# Patient Record
Sex: MALE | Race: WHITE | NOT HISPANIC OR LATINO | Employment: OTHER | URBAN - METROPOLITAN AREA
[De-identification: names, ages, dates, MRNs, and addresses within clinical notes are randomized per-mention and may not be internally consistent; named-entity substitution may affect disease eponyms.]

---

## 2024-03-01 ENCOUNTER — PRE-ADMISSION TESTING (OUTPATIENT)
Dept: PREADMISSION TESTING | Facility: HOSPITAL | Age: 69
End: 2024-03-01
Payer: MEDICARE

## 2024-03-01 VITALS — HEIGHT: 71 IN | BODY MASS INDEX: 34.57 KG/M2 | WEIGHT: 246.91 LBS

## 2024-03-01 LAB
ALBUMIN SERPL-MCNC: 4.6 G/DL (ref 3.5–5.2)
ALBUMIN/GLOB SERPL: 1.9 G/DL
ALP SERPL-CCNC: 88 U/L (ref 39–117)
ALT SERPL W P-5'-P-CCNC: 10 U/L (ref 1–41)
ANION GAP SERPL CALCULATED.3IONS-SCNC: 10 MMOL/L (ref 5–15)
APTT PPP: 28.1 SECONDS (ref 22–39)
AST SERPL-CCNC: 13 U/L (ref 1–40)
BASOPHILS # BLD AUTO: 0.07 10*3/MM3 (ref 0–0.2)
BASOPHILS NFR BLD AUTO: 0.7 % (ref 0–1.5)
BILIRUB SERPL-MCNC: 0.5 MG/DL (ref 0–1.2)
BUN SERPL-MCNC: 18 MG/DL (ref 8–23)
BUN/CREAT SERPL: 18.2 (ref 7–25)
CALCIUM SPEC-SCNC: 9.2 MG/DL (ref 8.6–10.5)
CHLORIDE SERPL-SCNC: 102 MMOL/L (ref 98–107)
CO2 SERPL-SCNC: 27 MMOL/L (ref 22–29)
CREAT SERPL-MCNC: 0.99 MG/DL (ref 0.76–1.27)
DEPRECATED RDW RBC AUTO: 43.3 FL (ref 37–54)
EGFRCR SERPLBLD CKD-EPI 2021: 84 ML/MIN/1.73
EOSINOPHIL # BLD AUTO: 0.29 10*3/MM3 (ref 0–0.4)
EOSINOPHIL NFR BLD AUTO: 3.1 % (ref 0.3–6.2)
ERYTHROCYTE [DISTWIDTH] IN BLOOD BY AUTOMATED COUNT: 12.1 % (ref 12.3–15.4)
GLOBULIN UR ELPH-MCNC: 2.4 GM/DL
GLUCOSE SERPL-MCNC: 95 MG/DL (ref 65–99)
HBA1C MFR BLD: 5.6 % (ref 4.8–5.6)
HCT VFR BLD AUTO: 44 % (ref 37.5–51)
HGB BLD-MCNC: 15.2 G/DL (ref 13–17.7)
IMM GRANULOCYTES # BLD AUTO: 0.06 10*3/MM3 (ref 0–0.05)
IMM GRANULOCYTES NFR BLD AUTO: 0.6 % (ref 0–0.5)
INR PPP: 1.01 (ref 0.89–1.12)
LYMPHOCYTES # BLD AUTO: 2.3 10*3/MM3 (ref 0.7–3.1)
LYMPHOCYTES NFR BLD AUTO: 24.4 % (ref 19.6–45.3)
MCH RBC QN AUTO: 33.5 PG (ref 26.6–33)
MCHC RBC AUTO-ENTMCNC: 34.5 G/DL (ref 31.5–35.7)
MCV RBC AUTO: 96.9 FL (ref 79–97)
MONOCYTES # BLD AUTO: 0.79 10*3/MM3 (ref 0.1–0.9)
MONOCYTES NFR BLD AUTO: 8.4 % (ref 5–12)
NEUTROPHILS NFR BLD AUTO: 5.91 10*3/MM3 (ref 1.7–7)
NEUTROPHILS NFR BLD AUTO: 62.8 % (ref 42.7–76)
NRBC BLD AUTO-RTO: 0 /100 WBC (ref 0–0.2)
PLATELET # BLD AUTO: 178 10*3/MM3 (ref 140–450)
PMV BLD AUTO: 9.3 FL (ref 6–12)
POTASSIUM SERPL-SCNC: 4.7 MMOL/L (ref 3.5–5.2)
PROT SERPL-MCNC: 7 G/DL (ref 6–8.5)
PROTHROMBIN TIME: 13.4 SECONDS (ref 12.2–14.5)
RBC # BLD AUTO: 4.54 10*6/MM3 (ref 4.14–5.8)
SODIUM SERPL-SCNC: 139 MMOL/L (ref 136–145)
WBC NRBC COR # BLD AUTO: 9.42 10*3/MM3 (ref 3.4–10.8)

## 2024-03-01 PROCEDURE — 36415 COLL VENOUS BLD VENIPUNCTURE: CPT

## 2024-03-01 PROCEDURE — 85610 PROTHROMBIN TIME: CPT

## 2024-03-01 PROCEDURE — 85730 THROMBOPLASTIN TIME PARTIAL: CPT

## 2024-03-01 PROCEDURE — 93005 ELECTROCARDIOGRAM TRACING: CPT

## 2024-03-01 PROCEDURE — 85025 COMPLETE CBC W/AUTO DIFF WBC: CPT

## 2024-03-01 PROCEDURE — 83036 HEMOGLOBIN GLYCOSYLATED A1C: CPT

## 2024-03-01 PROCEDURE — 80053 COMPREHEN METABOLIC PANEL: CPT

## 2024-03-01 PROCEDURE — 93010 ELECTROCARDIOGRAM REPORT: CPT | Performed by: INTERNAL MEDICINE

## 2024-03-01 RX ORDER — HYDROCODONE BITARTRATE AND ACETAMINOPHEN 5; 325 MG/1; MG/1
1 TABLET ORAL EVERY EVENING
COMMUNITY

## 2024-03-01 RX ORDER — ROSUVASTATIN CALCIUM 5 MG/1
5 TABLET, COATED ORAL DAILY
COMMUNITY

## 2024-03-01 RX ORDER — GLIMEPIRIDE 2 MG/1
2 TABLET ORAL
COMMUNITY

## 2024-03-01 RX ORDER — LISINOPRIL 2.5 MG/1
2.5 TABLET ORAL DAILY
COMMUNITY

## 2024-03-01 NOTE — PAT
An arrival time for procedure was not provided during PAT visit. If patient had any questions or concerns about their arrival time, they were instructed to contact their surgeon/physician.  Additionally, if the patient referred to an arrival time that was acquired from their my chart account, patient was encouraged to verify that time with their surgeon/physician. Arrival times are NOT provided in Pre Admission Testing Department.        Patient's surgeon called in a prescription for Benzol Peroxide 5% wash to [facility].  Patient instructed to  from [facility] pharmacy that was submitted electronically.  Verbal and written instructions given regarding proper use of the Benzoyl Peroxide wash were provided to patient and/or famlily during PAT visit. Patient/family also instructed to complete Benzol Peroxide checklist and return it to Pre-op on the day of surgery.  Patient and/or family verbalized understanding.      Additionally, reinforced with patient to acquire this prescription from the [facility] pharmacy before leaving the hospital after PAT visit due to the potential unavailability at local pharmacies.    Per Anesthesia Request, patient instructed not to take their ACE/ARB medications on the AM of surgery.

## 2024-03-02 LAB
QT INTERVAL: 334 MS
QTC INTERVAL: 428 MS

## 2024-03-07 ENCOUNTER — ANESTHESIA EVENT (OUTPATIENT)
Dept: PERIOP | Facility: HOSPITAL | Age: 69
End: 2024-03-07
Payer: MEDICARE

## 2024-03-07 RX ORDER — FAMOTIDINE 10 MG/ML
20 INJECTION, SOLUTION INTRAVENOUS ONCE
Status: CANCELLED | OUTPATIENT
Start: 2024-03-07 | End: 2024-03-07

## 2024-03-07 RX ORDER — SODIUM CHLORIDE 0.9 % (FLUSH) 0.9 %
10 SYRINGE (ML) INJECTION AS NEEDED
Status: CANCELLED | OUTPATIENT
Start: 2024-03-07

## 2024-03-07 RX ORDER — SODIUM CHLORIDE 9 MG/ML
40 INJECTION, SOLUTION INTRAVENOUS AS NEEDED
Status: CANCELLED | OUTPATIENT
Start: 2024-03-07

## 2024-03-08 ENCOUNTER — ANESTHESIA (OUTPATIENT)
Dept: PERIOP | Facility: HOSPITAL | Age: 69
End: 2024-03-08
Payer: MEDICARE

## 2024-03-08 ENCOUNTER — HOSPITAL ENCOUNTER (OUTPATIENT)
Facility: HOSPITAL | Age: 69
Discharge: HOME | End: 2024-03-09
Attending: ORTHOPAEDIC SURGERY | Admitting: ORTHOPAEDIC SURGERY
Payer: MEDICARE

## 2024-03-08 ENCOUNTER — APPOINTMENT (OUTPATIENT)
Dept: GENERAL RADIOLOGY | Facility: HOSPITAL | Age: 69
End: 2024-03-08
Payer: MEDICARE

## 2024-03-08 ENCOUNTER — ANESTHESIA EVENT CONVERTED (OUTPATIENT)
Dept: ANESTHESIOLOGY | Facility: HOSPITAL | Age: 69
End: 2024-03-08
Payer: MEDICARE

## 2024-03-08 DIAGNOSIS — Z96.611 STATUS POST REVERSE TOTAL REPLACEMENT OF RIGHT SHOULDER: Primary | ICD-10-CM

## 2024-03-08 PROBLEM — E66.9 OBESITY (BMI 30-39.9): Status: ACTIVE | Noted: 2024-03-08

## 2024-03-08 PROBLEM — I10 HTN (HYPERTENSION): Status: ACTIVE | Noted: 2024-03-08

## 2024-03-08 PROBLEM — E11.9 DM2 (DIABETES MELLITUS, TYPE 2): Status: ACTIVE | Noted: 2024-03-08

## 2024-03-08 PROBLEM — S42.209A PROXIMAL HUMERAL FRACTURE: Status: ACTIVE | Noted: 2024-03-08

## 2024-03-08 LAB
GLUCOSE BLDC GLUCOMTR-MCNC: 129 MG/DL (ref 70–130)
GLUCOSE BLDC GLUCOMTR-MCNC: 219 MG/DL (ref 70–130)
GLUCOSE BLDC GLUCOMTR-MCNC: 264 MG/DL (ref 70–130)

## 2024-03-08 PROCEDURE — A9270 NON-COVERED ITEM OR SERVICE: HCPCS | Performed by: INTERNAL MEDICINE

## 2024-03-08 PROCEDURE — 97110 THERAPEUTIC EXERCISES: CPT

## 2024-03-08 PROCEDURE — G0378 HOSPITAL OBSERVATION PER HR: HCPCS

## 2024-03-08 PROCEDURE — 25010000002 ROPIVACAINE HCL-NACL 0.2-0.9 % SOLUTION: Performed by: ORTHOPAEDIC SURGERY

## 2024-03-08 PROCEDURE — 25010000002 DEXAMETHASONE SODIUM PHOSPHATE 10 MG/ML SOLUTION: Performed by: NURSE ANESTHETIST, CERTIFIED REGISTERED

## 2024-03-08 PROCEDURE — 25010000002 PROPOFOL 10 MG/ML EMULSION: Performed by: ANESTHESIOLOGY

## 2024-03-08 PROCEDURE — 25010000002 FENTANYL CITRATE (PF) 50 MCG/ML SOLUTION: Performed by: NURSE ANESTHETIST, CERTIFIED REGISTERED

## 2024-03-08 PROCEDURE — 63710000001 INSULIN LISPRO (HUMAN) PER 5 UNITS: Performed by: INTERNAL MEDICINE

## 2024-03-08 PROCEDURE — C1713 ANCHOR/SCREW BN/BN,TIS/BN: HCPCS | Performed by: ORTHOPAEDIC SURGERY

## 2024-03-08 PROCEDURE — 25810000003 LACTATED RINGERS PER 1000 ML: Performed by: ANESTHESIOLOGY

## 2024-03-08 PROCEDURE — 25010000002 SUGAMMADEX 200 MG/2ML SOLUTION: Performed by: ANESTHESIOLOGY

## 2024-03-08 PROCEDURE — 94640 AIRWAY INHALATION TREATMENT: CPT

## 2024-03-08 PROCEDURE — 82948 REAGENT STRIP/BLOOD GLUCOSE: CPT

## 2024-03-08 PROCEDURE — C1889 IMPLANT/INSERT DEVICE, NOC: HCPCS | Performed by: ORTHOPAEDIC SURGERY

## 2024-03-08 PROCEDURE — 25010000002 PHENYLEPHRINE 10 MG/ML SOLUTION: Performed by: ANESTHESIOLOGY

## 2024-03-08 PROCEDURE — C1776 JOINT DEVICE (IMPLANTABLE): HCPCS | Performed by: ORTHOPAEDIC SURGERY

## 2024-03-08 PROCEDURE — 25010000002 ONDANSETRON PER 1 MG

## 2024-03-08 PROCEDURE — 25810000003 SODIUM CHLORIDE 0.9 % SOLUTION 250 ML FLEX CONT: Performed by: ANESTHESIOLOGY

## 2024-03-08 PROCEDURE — 63710000001 INSULIN DETEMIR PER 5 UNITS: Performed by: INTERNAL MEDICINE

## 2024-03-08 PROCEDURE — 25010000002 BUPIVACAINE (PF) 0.25 % SOLUTION: Performed by: NURSE ANESTHETIST, CERTIFIED REGISTERED

## 2024-03-08 PROCEDURE — 94799 UNLISTED PULMONARY SVC/PX: CPT

## 2024-03-08 PROCEDURE — 63710000001 ROSUVASTATIN 10 MG TABLET: Performed by: INTERNAL MEDICINE

## 2024-03-08 PROCEDURE — 25010000002 PHENYLEPHRINE 10 MG/ML SOLUTION 1 ML VIAL: Performed by: ANESTHESIOLOGY

## 2024-03-08 PROCEDURE — 73020 X-RAY EXAM OF SHOULDER: CPT

## 2024-03-08 PROCEDURE — 25010000002 DEXAMETHASONE PER 1 MG

## 2024-03-08 PROCEDURE — 25010000002 CEFAZOLIN PER 500 MG: Performed by: ORTHOPAEDIC SURGERY

## 2024-03-08 PROCEDURE — 25010000002 VANCOMYCIN 1 G RECONSTITUTED SOLUTION: Performed by: ORTHOPAEDIC SURGERY

## 2024-03-08 PROCEDURE — 97166 OT EVAL MOD COMPLEX 45 MIN: CPT

## 2024-03-08 DEVICE — ABSORBABLE HEMOSTAT (OXIDIZED REGENERATED CELLULOSE)
Type: IMPLANTABLE DEVICE | Site: SHOULDER | Status: FUNCTIONAL
Brand: SURGICEL

## 2024-03-08 DEVICE — LINER HUM EQUINOXE REVSHLDR 38PLS2.5: Type: IMPLANTABLE DEVICE | Site: SHOULDER | Status: FUNCTIONAL

## 2024-03-08 DEVICE — KWIRE EQUINOXE GLENOID NITNL 2X190MM NS: Type: IMPLANTABLE DEVICE | Site: SHOULDER | Status: FUNCTIONAL

## 2024-03-08 DEVICE — SCRW COMPR EQUINOXE LK 4.5X30MM: Type: IMPLANTABLE DEVICE | Site: SHOULDER | Status: FUNCTIONAL

## 2024-03-08 DEVICE — SCRW LK EQUINOXE GLENOSPHERE REV/SHLDR: Type: IMPLANTABLE DEVICE | Site: SHOULDER | Status: FUNCTIONAL

## 2024-03-08 DEVICE — SUT LP BONE NICELOOP NONABS W/NDL SZ5 24IN WHT: Type: IMPLANTABLE DEVICE | Site: SHOULDER | Status: FUNCTIONAL

## 2024-03-08 DEVICE — SUT LP BONE NICELOOP NONABS W/NDL SZ5 24IN GRN: Type: IMPLANTABLE DEVICE | Site: SHOULDER | Status: FUNCTIONAL

## 2024-03-08 DEVICE — SCRW EQUINOXE TORQ DEFINE REV SHLDR KT: Type: IMPLANTABLE DEVICE | Site: SHOULDER | Status: FUNCTIONAL

## 2024-03-08 DEVICE — SUT LP BONE NICELOOP NONABS BR W/NDL SZ5 24IN WHT/GRN: Type: IMPLANTABLE DEVICE | Site: SHOULDER | Status: FUNCTIONAL

## 2024-03-08 DEVICE — GLENOSPHERE SHLDR/REV EQUINOXE W/CP/LK/EXT 38MM: Type: IMPLANTABLE DEVICE | Site: SHOULDER | Status: FUNCTIONAL

## 2024-03-08 DEVICE — SCRW COMPR EQUINOXE LK 4.5X34MM: Type: IMPLANTABLE DEVICE | Site: SHOULDER | Status: FUNCTIONAL

## 2024-03-08 DEVICE — PLT GLEN JNT EQUINOXE AUG/SUPERIOR 10DEG: Type: IMPLANTABLE DEVICE | Site: SHOULDER | Status: FUNCTIONAL

## 2024-03-08 DEVICE — STEM HUM PRI EQUINX PF 11MM: Type: IMPLANTABLE DEVICE | Site: SHOULDER | Status: FUNCTIONAL

## 2024-03-08 DEVICE — IMPLANTABLE DEVICE: Type: IMPLANTABLE DEVICE | Site: SHOULDER | Status: FUNCTIONAL

## 2024-03-08 DEVICE — TRY HUM EQUINOXE ADPT REV SHLDR PLS5: Type: IMPLANTABLE DEVICE | Site: SHOULDER | Status: FUNCTIONAL

## 2024-03-08 RX ORDER — PHENYLEPHRINE HYDROCHLORIDE 10 MG/ML
INJECTION INTRAVENOUS AS NEEDED
Status: DISCONTINUED | OUTPATIENT
Start: 2024-03-08 | End: 2024-03-08 | Stop reason: SURG

## 2024-03-08 RX ORDER — ONDANSETRON 2 MG/ML
INJECTION INTRAMUSCULAR; INTRAVENOUS AS NEEDED
Status: DISCONTINUED | OUTPATIENT
Start: 2024-03-08 | End: 2024-03-08 | Stop reason: SURG

## 2024-03-08 RX ORDER — ROPIVACAINE HYDROCHLORIDE 2 MG/ML
INJECTION, SOLUTION EPIDURAL; INFILTRATION; PERINEURAL CONTINUOUS
Status: DISCONTINUED | OUTPATIENT
Start: 2024-03-08 | End: 2024-03-09 | Stop reason: HOSPADM

## 2024-03-08 RX ORDER — LIDOCAINE HYDROCHLORIDE 10 MG/ML
0.5 INJECTION, SOLUTION EPIDURAL; INFILTRATION; INTRACAUDAL; PERINEURAL ONCE AS NEEDED
Status: COMPLETED | OUTPATIENT
Start: 2024-03-08 | End: 2024-03-08

## 2024-03-08 RX ORDER — TRANEXAMIC ACID 10 MG/ML
1000 INJECTION, SOLUTION INTRAVENOUS ONCE
Status: COMPLETED | OUTPATIENT
Start: 2024-03-08 | End: 2024-03-08

## 2024-03-08 RX ORDER — NICOTINE 21 MG/24HR
1 PATCH, TRANSDERMAL 24 HOURS TRANSDERMAL
Status: DISCONTINUED | OUTPATIENT
Start: 2024-03-08 | End: 2024-03-09 | Stop reason: HOSPADM

## 2024-03-08 RX ORDER — MIDAZOLAM HYDROCHLORIDE 1 MG/ML
0.5 INJECTION INTRAMUSCULAR; INTRAVENOUS
Status: DISCONTINUED | OUTPATIENT
Start: 2024-03-08 | End: 2024-03-08 | Stop reason: HOSPADM

## 2024-03-08 RX ORDER — ACETAMINOPHEN 500 MG
1000 TABLET ORAL ONCE
Status: COMPLETED | OUTPATIENT
Start: 2024-03-08 | End: 2024-03-08

## 2024-03-08 RX ORDER — VANCOMYCIN HYDROCHLORIDE 1 G/20ML
INJECTION, POWDER, LYOPHILIZED, FOR SOLUTION INTRAVENOUS AS NEEDED
Status: DISCONTINUED | OUTPATIENT
Start: 2024-03-08 | End: 2024-03-08 | Stop reason: HOSPADM

## 2024-03-08 RX ORDER — MAGNESIUM HYDROXIDE 1200 MG/15ML
LIQUID ORAL AS NEEDED
Status: DISCONTINUED | OUTPATIENT
Start: 2024-03-08 | End: 2024-03-08 | Stop reason: HOSPADM

## 2024-03-08 RX ORDER — BUPIVACAINE HYDROCHLORIDE 2.5 MG/ML
INJECTION, SOLUTION EPIDURAL; INFILTRATION; INTRACAUDAL
Status: COMPLETED | OUTPATIENT
Start: 2024-03-08 | End: 2024-03-08

## 2024-03-08 RX ORDER — IPRATROPIUM BROMIDE AND ALBUTEROL SULFATE 2.5; .5 MG/3ML; MG/3ML
SOLUTION RESPIRATORY (INHALATION)
Status: COMPLETED
Start: 2024-03-08 | End: 2024-03-08

## 2024-03-08 RX ORDER — IPRATROPIUM BROMIDE AND ALBUTEROL SULFATE 2.5; .5 MG/3ML; MG/3ML
3 SOLUTION RESPIRATORY (INHALATION) ONCE
Status: COMPLETED | OUTPATIENT
Start: 2024-03-08 | End: 2024-03-08

## 2024-03-08 RX ORDER — NALOXONE HCL 0.4 MG/ML
0.1 VIAL (ML) INJECTION
Status: DISCONTINUED | OUTPATIENT
Start: 2024-03-08 | End: 2024-03-09 | Stop reason: HOSPADM

## 2024-03-08 RX ORDER — DEXTROSE MONOHYDRATE 25 G/50ML
25 INJECTION, SOLUTION INTRAVENOUS
Status: DISCONTINUED | OUTPATIENT
Start: 2024-03-08 | End: 2024-03-09 | Stop reason: HOSPADM

## 2024-03-08 RX ORDER — ROSUVASTATIN CALCIUM 10 MG/1
5 TABLET, COATED ORAL NIGHTLY
Status: DISCONTINUED | OUTPATIENT
Start: 2024-03-08 | End: 2024-03-09 | Stop reason: HOSPADM

## 2024-03-08 RX ORDER — DEXAMETHASONE SODIUM PHOSPHATE 4 MG/ML
INJECTION, SOLUTION INTRA-ARTICULAR; INTRALESIONAL; INTRAMUSCULAR; INTRAVENOUS; SOFT TISSUE AS NEEDED
Status: DISCONTINUED | OUTPATIENT
Start: 2024-03-08 | End: 2024-03-08 | Stop reason: SURG

## 2024-03-08 RX ORDER — PREGABALIN 75 MG/1
75 CAPSULE ORAL ONCE
Status: COMPLETED | OUTPATIENT
Start: 2024-03-08 | End: 2024-03-08

## 2024-03-08 RX ORDER — IBUPROFEN 600 MG/1
1 TABLET ORAL
Status: DISCONTINUED | OUTPATIENT
Start: 2024-03-08 | End: 2024-03-09 | Stop reason: HOSPADM

## 2024-03-08 RX ORDER — OXYCODONE HYDROCHLORIDE 5 MG/1
5 TABLET ORAL EVERY 4 HOURS PRN
Status: DISCONTINUED | OUTPATIENT
Start: 2024-03-08 | End: 2024-03-09 | Stop reason: HOSPADM

## 2024-03-08 RX ORDER — LABETALOL HYDROCHLORIDE 5 MG/ML
10 INJECTION, SOLUTION INTRAVENOUS EVERY 4 HOURS PRN
Status: DISCONTINUED | OUTPATIENT
Start: 2024-03-08 | End: 2024-03-09 | Stop reason: HOSPADM

## 2024-03-08 RX ORDER — FENTANYL CITRATE 50 UG/ML
50 INJECTION, SOLUTION INTRAMUSCULAR; INTRAVENOUS
Status: DISCONTINUED | OUTPATIENT
Start: 2024-03-08 | End: 2024-03-08 | Stop reason: HOSPADM

## 2024-03-08 RX ORDER — HYDROMORPHONE HYDROCHLORIDE 1 MG/ML
0.5 INJECTION, SOLUTION INTRAMUSCULAR; INTRAVENOUS; SUBCUTANEOUS
Status: DISCONTINUED | OUTPATIENT
Start: 2024-03-08 | End: 2024-03-08 | Stop reason: HOSPADM

## 2024-03-08 RX ORDER — SODIUM CHLORIDE, SODIUM LACTATE, POTASSIUM CHLORIDE, CALCIUM CHLORIDE 600; 310; 30; 20 MG/100ML; MG/100ML; MG/100ML; MG/100ML
9 INJECTION, SOLUTION INTRAVENOUS CONTINUOUS
Status: DISCONTINUED | OUTPATIENT
Start: 2024-03-08 | End: 2024-03-08

## 2024-03-08 RX ORDER — PROPOFOL 10 MG/ML
VIAL (ML) INTRAVENOUS AS NEEDED
Status: DISCONTINUED | OUTPATIENT
Start: 2024-03-08 | End: 2024-03-08 | Stop reason: SURG

## 2024-03-08 RX ORDER — HYDROMORPHONE HYDROCHLORIDE 1 MG/ML
0.5 INJECTION, SOLUTION INTRAMUSCULAR; INTRAVENOUS; SUBCUTANEOUS
Status: DISCONTINUED | OUTPATIENT
Start: 2024-03-08 | End: 2024-03-09 | Stop reason: HOSPADM

## 2024-03-08 RX ORDER — FAMOTIDINE 20 MG/1
20 TABLET, FILM COATED ORAL ONCE
Status: COMPLETED | OUTPATIENT
Start: 2024-03-08 | End: 2024-03-08

## 2024-03-08 RX ORDER — ROCURONIUM BROMIDE 10 MG/ML
INJECTION, SOLUTION INTRAVENOUS AS NEEDED
Status: DISCONTINUED | OUTPATIENT
Start: 2024-03-08 | End: 2024-03-08 | Stop reason: SURG

## 2024-03-08 RX ORDER — DEXAMETHASONE SODIUM PHOSPHATE 10 MG/ML
INJECTION, SOLUTION INTRAMUSCULAR; INTRAVENOUS
Status: COMPLETED | OUTPATIENT
Start: 2024-03-08 | End: 2024-03-08

## 2024-03-08 RX ORDER — FENTANYL CITRATE 50 UG/ML
INJECTION, SOLUTION INTRAMUSCULAR; INTRAVENOUS
Status: COMPLETED | OUTPATIENT
Start: 2024-03-08 | End: 2024-03-08

## 2024-03-08 RX ORDER — DOCUSATE SODIUM 100 MG/1
100 CAPSULE, LIQUID FILLED ORAL 2 TIMES DAILY PRN
Status: DISCONTINUED | OUTPATIENT
Start: 2024-03-08 | End: 2024-03-09 | Stop reason: HOSPADM

## 2024-03-08 RX ORDER — SODIUM CHLORIDE 450 MG/100ML
50 INJECTION, SOLUTION INTRAVENOUS CONTINUOUS
Status: DISCONTINUED | OUTPATIENT
Start: 2024-03-08 | End: 2024-03-09 | Stop reason: HOSPADM

## 2024-03-08 RX ORDER — LISINOPRIL 5 MG/1
2.5 TABLET ORAL DAILY
Status: DISCONTINUED | OUTPATIENT
Start: 2024-03-09 | End: 2024-03-09 | Stop reason: HOSPADM

## 2024-03-08 RX ORDER — ACETAMINOPHEN 325 MG/1
650 TABLET ORAL EVERY 4 HOURS PRN
Status: DISCONTINUED | OUTPATIENT
Start: 2024-03-08 | End: 2024-03-09 | Stop reason: HOSPADM

## 2024-03-08 RX ORDER — ACETAMINOPHEN 650 MG/1
650 SUPPOSITORY RECTAL EVERY 4 HOURS PRN
Status: DISCONTINUED | OUTPATIENT
Start: 2024-03-08 | End: 2024-03-09 | Stop reason: HOSPADM

## 2024-03-08 RX ORDER — SODIUM CHLORIDE 0.9 % (FLUSH) 0.9 %
10 SYRINGE (ML) INJECTION EVERY 12 HOURS SCHEDULED
Status: DISCONTINUED | OUTPATIENT
Start: 2024-03-08 | End: 2024-03-08 | Stop reason: HOSPADM

## 2024-03-08 RX ORDER — NICOTINE POLACRILEX 4 MG
15 LOZENGE BUCCAL
Status: DISCONTINUED | OUTPATIENT
Start: 2024-03-08 | End: 2024-03-09 | Stop reason: HOSPADM

## 2024-03-08 RX ORDER — INSULIN LISPRO 100 [IU]/ML
2-7 INJECTION, SOLUTION INTRAVENOUS; SUBCUTANEOUS
Status: DISCONTINUED | OUTPATIENT
Start: 2024-03-08 | End: 2024-03-09 | Stop reason: HOSPADM

## 2024-03-08 RX ORDER — EPHEDRINE SULFATE 50 MG/ML
INJECTION INTRAVENOUS AS NEEDED
Status: DISCONTINUED | OUTPATIENT
Start: 2024-03-08 | End: 2024-03-08 | Stop reason: SURG

## 2024-03-08 RX ADMIN — DEXAMETHASONE SODIUM PHOSPHATE 4 MG: 4 INJECTION INTRA-ARTICULAR; INTRALESIONAL; INTRAMUSCULAR; INTRAVENOUS; SOFT TISSUE at 12:50

## 2024-03-08 RX ADMIN — ACETAMINOPHEN 1000 MG: 500 TABLET ORAL at 11:04

## 2024-03-08 RX ADMIN — EPHEDRINE SULFATE 10 MG: 50 INJECTION INTRAVENOUS at 13:53

## 2024-03-08 RX ADMIN — TRANEXAMIC ACID 1000 MG: 10 INJECTION, SOLUTION INTRAVENOUS at 12:54

## 2024-03-08 RX ADMIN — PREGABALIN 75 MG: 75 CAPSULE ORAL at 11:04

## 2024-03-08 RX ADMIN — SODIUM CHLORIDE 2000 MG: 900 INJECTION INTRAVENOUS at 12:48

## 2024-03-08 RX ADMIN — PHENYLEPHRINE HYDROCHLORIDE 50 MCG/MIN: 10 INJECTION INTRAVENOUS at 12:52

## 2024-03-08 RX ADMIN — INSULIN DETEMIR 15 UNITS: 100 INJECTION, SOLUTION SUBCUTANEOUS at 20:44

## 2024-03-08 RX ADMIN — SUGAMMADEX 200 MG: 100 INJECTION, SOLUTION INTRAVENOUS at 15:34

## 2024-03-08 RX ADMIN — LIDOCAINE HYDROCHLORIDE 0.5 ML: 10 INJECTION, SOLUTION EPIDURAL; INFILTRATION; INTRACAUDAL; PERINEURAL at 11:04

## 2024-03-08 RX ADMIN — EPHEDRINE SULFATE 10 MG: 50 INJECTION INTRAVENOUS at 14:36

## 2024-03-08 RX ADMIN — ROSUVASTATIN 5 MG: 10 TABLET, FILM COATED ORAL at 20:44

## 2024-03-08 RX ADMIN — FENTANYL CITRATE 100 MCG: 50 INJECTION, SOLUTION INTRAMUSCULAR; INTRAVENOUS at 11:29

## 2024-03-08 RX ADMIN — TRANEXAMIC ACID 1000 MG: 10 INJECTION, SOLUTION INTRAVENOUS at 15:03

## 2024-03-08 RX ADMIN — IPRATROPIUM BROMIDE AND ALBUTEROL SULFATE 3 ML: 2.5; .5 SOLUTION RESPIRATORY (INHALATION) at 16:46

## 2024-03-08 RX ADMIN — SODIUM CHLORIDE, POTASSIUM CHLORIDE, SODIUM LACTATE AND CALCIUM CHLORIDE 9 ML/HR: 600; 310; 30; 20 INJECTION, SOLUTION INTRAVENOUS at 11:04

## 2024-03-08 RX ADMIN — FAMOTIDINE 20 MG: 20 TABLET, FILM COATED ORAL at 11:04

## 2024-03-08 RX ADMIN — BUPIVACAINE HYDROCHLORIDE 15 ML: 2.5 INJECTION, SOLUTION EPIDURAL; INFILTRATION; INTRACAUDAL; PERINEURAL at 11:35

## 2024-03-08 RX ADMIN — BUPIVACAINE HYDROCHLORIDE 30 ML: 2.5 INJECTION, SOLUTION EPIDURAL; INFILTRATION; INTRACAUDAL at 11:43

## 2024-03-08 RX ADMIN — INSULIN LISPRO 4 UNITS: 100 INJECTION, SOLUTION INTRAVENOUS; SUBCUTANEOUS at 20:59

## 2024-03-08 RX ADMIN — PHENYLEPHRINE HYDROCHLORIDE 100 MCG: 10 INJECTION INTRAVENOUS at 12:55

## 2024-03-08 RX ADMIN — SODIUM CHLORIDE 2000 MG: 900 INJECTION INTRAVENOUS at 20:43

## 2024-03-08 RX ADMIN — SODIUM CHLORIDE 50 ML/HR: 4.5 INJECTION, SOLUTION INTRAVENOUS at 17:15

## 2024-03-08 RX ADMIN — DEXAMETHASONE SODIUM PHOSPHATE 2 MG: 10 INJECTION, SOLUTION INTRAMUSCULAR; INTRAVENOUS at 11:43

## 2024-03-08 RX ADMIN — PROPOFOL 200 MG: 10 INJECTION, EMULSION INTRAVENOUS at 12:44

## 2024-03-08 RX ADMIN — ROCURONIUM BROMIDE 20 MG: 10 INJECTION INTRAVENOUS at 14:57

## 2024-03-08 RX ADMIN — Medication 1000 MG: at 15:55

## 2024-03-08 RX ADMIN — ROCURONIUM BROMIDE 50 MG: 10 INJECTION INTRAVENOUS at 12:44

## 2024-03-08 RX ADMIN — ONDANSETRON 4 MG: 2 INJECTION INTRAMUSCULAR; INTRAVENOUS at 12:50

## 2024-03-08 NOTE — OP NOTE
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 right reverse total shoulder arthroplasty for proximal humerus fracture nonunion           SURGEONS: Bennett Noel MD      ASSISTANTS:  1. Betzy Sinclair PA-C  ** Please note the physician assistant was medically necessary to assist with positioning retraction, arm positioning, care of soft tissues and closure     ANESTHESIA: General plus block.       ESTIMATED BLOOD LOSS:200mL.       COMPLICATIONS: None.       DISPOSITION: Recovery room in stable condition.       IMPLANTS: [employer]  Humeral Stem: 11 stem  Polyethylene + tray:  38 +5 tray, 38+2.5mm std  Baseplate: 10deg superior augment  Glenosphere: 38std      Tornier Nice Loops x 4        INDICATIONS: This is a 68  -year-old male who sustained above diagnosis.  After a discussion of risks, benefits, and alternatives, the patient wished to proceed with reverse shoulder arthroplasty.     DESCRIPTION OF PROCEDURE: On the day of surgery, the patient identified the right shoulder as the correct operative extremity. This was initialed by the surgeon with the patient's acknowledgment. The patient underwent placement of an interscalene block and was then taken to the operating room and placed in the supine position. Upon induction of adequate anesthesia, the patient was brought up to the beach chair position and the right shoulder and upper extremity were prepped and draped in the usual sterile fashion. A timeout confirmed the correct patient and operative extremity, and that antibiotics were on board.   A standard deltopectoral approach to the shoulder was carried out and was carried sharply through the skin and subcutaneous tissue. Medial and lateral flaps were developed over the deltopectoral fascia. The cephalic vein was identified and mobilized  laterally with the deltoid. The subdeltoid and subpectoral spaces were mobilized and a blunt retractor placed deep to this. The clavipectoral fascia was opened on the lateral edge of the conjoined tendon. The retractors were moved deep to this. The fracture was readily identified. The leading edge/upper 1cm of the pec was released and the long head of the biceps was exposed and was tenosynovitic, and was therefore tenotomized .   The lesser tuberosity was  then identified and tagging stitch placed. A tagging stitch was placed at the bone-tendon junction of the greater tuberosity and supraspinatus/infraspinatus.      The fractured humeral head was excised after osteotomizing the greater and lesser tubeoristies and freeing up the greater and lesser tuberosities.  The humeral shaft was retracted posteriorly exposing the glenoid. Circumferential labral excision and capsular release was carried out. A centering hole was drilled. The glenoid was then reamed gently. A large centering hole was then drilled in the center of the glenoid and the baseplate was impacted in. Screws were then placed securing the baseplate to the glenoid. The glenosphere was then inserted and locked in uneventfully.      The humerus was subluxed back anteriorly. Nice loops were placed through the greater tuberosity.   The canal was entered. Trialing was carried out after reaming and the appropriate size was chosen. The stem was then trialed and the final components chosen.  The components were assembled on the back table.        The stem was then inserted in 20° of retroversion.  The tuberosity and subscapularis were carefully repaired back to the stem with bone graft from the head packed under the tuberosity using multiple Nice Loop sutures/technique in a cerclage fashion around the stem and both tuberosities. The resulting construct was stable.    The wound was then copiously irrigated with orthopedic irrigation.Vancomycin powder wasplaced in the  wound The deltopectoral interval was approximated with 0 Vicryl, the subcutaneous tissue with 2-0 Vicryl, and the skin with 3-0 nylon. Sterile dressing was placed and patient was placed in sling. Anesthesia was reversed and the patient was taken to the recovery room in stable condition. All instrument, needle, and sponge counts were correct. There were no noted complications, patient tolerated procedure well.

## 2024-03-08 NOTE — ANESTHESIA PROCEDURE NOTES
Peripheral Block      Patient reassessed immediately prior to procedure    Patient location during procedure: pre-op  Reason for block: at surgeon's request and post-op pain management  Performed by  CRNA/CAA: Connor Barroso CRNA  Assisted by: Brigid Ferrari RN  Preanesthetic Checklist  Completed: patient identified, IV checked, site marked, risks and benefits discussed, surgical consent, monitors and equipment checked, pre-op evaluation and timeout performed  Prep:  Pt Position: left lateral decubitus  Sterile barriers:cap, gloves, mask and washed/disinfected hands  Prep: ChloraPrep  Patient monitoring: blood pressure monitoring, continuous pulse oximetry and EKG  Procedure    Sedation: yes  Performed under: local infiltration  Guidance:ultrasound guided    ULTRASOUND INTERPRETATION.  Using ultrasound guidance a 20 G gauge needle was placed in close proximity to the nerve, at which point, under ultrasound guidance anesthetic was injected in the area of the nerve and spread of the anesthesia was seen on ultrasound in close proximity thereto.  There were no abnormalities seen on ultrasound; a digital image was taken; and the patient tolerated the procedure with no complications. Images:still images obtained, printed/placed on chart    Laterality:right  Block Type:interscalene  Injection Technique:catheter  Needle Type:Tuohy and echogenic  Needle Gauge:18 G  Resistance on Injection: none  Catheter Size:20 G (20g)  Cath Depth at skin: 11 cm    Medications Used: fentaNYL citrate (PF) (SUBLIMAZE) injection - Intravenous   100 mcg - 3/8/2024 11:29:00 AM  bupivacaine PF (MARCAINE) 0.25 % injection - Injection   15 mL - 3/8/2024 11:35:00 AM      Medications  Preservative Free Saline:5ml    Post Assessment  Injection Assessment: negative aspiration for heme, no paresthesia on injection and incremental injection  Patient Tolerance:comfortable throughout block  Complications:no  Additional Notes  CATHETER  A  "high-frequency linear transducer, with sterile cover, was placed in the supraclavicular fossa to identify the subclavian artery and trunks and divisions of the brachial plexus. The transducer was then moved in a cephalad orientation with a slight rotation to continue visualization of the brachial plexus from the trunks and divisions, on to the C5-C7 roots. The insertion site was prepped and draped in sterile fashion. Skin and cutaneous tissue was infiltrated with 2-5 ml of 1% Lidocaine. Using ultrasound-guidance, an 18-gauge Contiplex Ultra 360 Touhy needle was advanced in plane from lateral to medial. Preservative-free normal saline was utilized for hydro-dissection of tissue, advancement of Touhy, and to confirm final needle placement at the fascial plane between the middle scalene muscle and sheath of the brachial plexus (C5-C7). A 20-gauge Contiplex Echo catheter was placed through the needle and advance out the tip of the Touhy 3-5 cm with the \"Braxton Flip\". The Touhy needle was then removed, and final catheter position verified lateral to the brachial plexus with local anesthetic (LA) and ultrasound visualization. The catheter was secured in the usual fashion with skin glue, benzoin, steri-strips, CHG tegaderm and label noting \"Nerve Block Catheter\". Jerk tape applied at yellow connector and catheter connection. All LA was injected in increments of 3-5 ml after catheter secured. Aspiration every 5 ml to prevent intravascular injection. Injection was completed with negative aspiration of blood and negative intravascular injection. Injection pressures were normal with minimal resistance.             "

## 2024-03-08 NOTE — H&P
Pre-Op H&P  Matt Pruett  [id number]  1955      Chief complaint: Right shoulder pain      Subjective:  Patient is a 68 y.o.male presents for scheduled surgery by Dr. Noel. He anticipates a REVERSE TOTAL SHOULDER ARTHROPLASTY - RIGHT  today. His shoulder has been painful for over 2 years. He injured his shoulder in a fall. He has occasional numbness, tingling or difficulty with . He states he tried PT for a year without benefit.      Review of Systems:  Constitutional-- No fever, chills or sweats. No fatigue.  CV-- No chest pain, palpitation or syncope. +HTN, HLD  Resp-- No SOB, cough, hemoptysis  Skin--No rashes or lesions      Allergies: No Known Allergies      Home Meds:  Medications Prior to Admission   Medication Sig Dispense Refill Last Dose    benzoyl peroxide 5 % external wash Use as directed by Dr. Noel. 148 mL 0     DICLOFENAC PO Take 50 mg by mouth 2 (Two) Times a Day.       glimepiride (AMARYL) 2 MG tablet Take 1 tablet by mouth Every Morning Before Breakfast.       HYDROcodone-acetaminophen (NORCO) 5-325 MG per tablet Take 1 tablet by mouth Every Evening.       lisinopril (PRINIVIL,ZESTRIL) 2.5 MG tablet Take 1 tablet by mouth Daily.       metFORMIN (GLUCOPHAGE) 500 MG tablet Take 1 tablet by mouth 2 (Two) Times a Day With Meals.       ondansetron (ZOFRAN) 4 MG tablet Take 1 tablet by mouth Every 8 (Eight) Hours As Needed for post-operative nausea. 30 tablet 0     rosuvastatin (CRESTOR) 5 MG tablet Take 1 tablet by mouth Daily.            PMH:   Past Medical History:   Diagnosis Date    Cancer     LUNG, CURRENTLY RECEIVING CHEMO    Diabetes mellitus     Hypertension     Osteoarthritis      PSH:    Past Surgical History:   Procedure Laterality Date    FINGER SURGERY      DEBRIDEMENT DUE TO BROWN RECLUSE SPIDER BITE    ROTATOR CUFF REPAIR Left        Immunization History:  Influenza: No  Pneumococcal: No  Tetanus: UTD  Covid : No    Social History:   Tobacco:   Social History      Tobacco Use   Smoking Status Some Days    Current packs/day: 0.25    Average packs/day: 0.3 packs/day for 30.0 years (7.5 ttl pk-yrs)    Types: Cigarettes   Smokeless Tobacco Not on file      Alcohol:     Social History     Substance and Sexual Activity   Alcohol Use Never         Physical Exam: VS: /79  HR 98  RR 16  T 97.4  Sat 96%RA      General Appearance:    Alert, cooperative, no distress, appears stated age   Head:    Normocephalic, without obvious abnormality, atraumatic   Lungs:     Clear to auscultation bilaterally, respirations unlabored    Heart:   Regular rate and rhythm, S1 and S2 normal    Abdomen:    Soft without tenderness   Extremities:   Extremities normal, atraumatic, no cyanosis or edema   Skin:   Skin color, texture, turgor normal, no rashes or lesions   Neurologic:   Grossly intact     Results Review:     LABS:  Lab Results   Component Value Date    WBC 9.42 03/01/2024    HGB 15.2 03/01/2024    HCT 44.0 03/01/2024    MCV 96.9 03/01/2024     03/01/2024    NEUTROABS 5.91 03/01/2024    GLUCOSE 95 03/01/2024    BUN 18 03/01/2024    CREATININE 0.99 03/01/2024     03/01/2024    K 4.7 03/01/2024     03/01/2024    CO2 27.0 03/01/2024    CALCIUM 9.2 03/01/2024    ALBUMIN 4.6 03/01/2024    AST 13 03/01/2024    ALT 10 03/01/2024    BILITOT 0.5 03/01/2024       RADIOLOGY:  Imaging Results (Last 72 Hours)       ** No results found for the last 72 hours. **            I reviewed the patient's new clinical results.    Cancer Staging (if applicable)  Cancer Patient: __ yes __no __unknown; If yes, clinical stage T:__ N:__M:__, stage group or __N/A      Impression: Right shoulder pain      Plan: REVERSE TOTAL SHOULDER ARTHROPLASTY - RIGHT       Haily Dominguez, APRN   3/8/2024   10:54 EST

## 2024-03-08 NOTE — ANESTHESIA POSTPROCEDURE EVALUATION
Patient: Matt Pruett    Procedure Summary       Date: 03/08/24 Room / [facility]:  [facility]    Anesthesia Start: 1238 Anesthesia Stop: 1546    Procedure: REVERSE TOTAL SHOULDER ARTHROPLASTY - RIGHT (Right: Shoulder) Diagnosis:     Surgeons: Bennett Noel MD Provider: Brigid Rajan DO    Anesthesia Type: general with block ASA Status: 3            Anesthesia Type: general with block    Vitals  Vitals Value Taken Time   BP     Temp     Pulse 106 03/08/24 1546   Resp     SpO2 89 % 03/08/24 1546   Vitals shown include unfiled device data.        Post Anesthesia Care and Evaluation    Patient location during evaluation: PACU  Patient participation: complete - patient participated  Level of consciousness: awake and alert  Pain management: adequate    Airway patency: patent  Anesthetic complications: No anesthetic complications  PONV Status: none  Cardiovascular status: hemodynamically stable and acceptable  Respiratory status: nonlabored ventilation, acceptable and nasal cannula  Hydration status: acceptable

## 2024-03-08 NOTE — ANESTHESIA PROCEDURE NOTES
Peripheral Block      Patient reassessed immediately prior to procedure    Patient location during procedure: OR  Reason for block: at surgeon's request and post-op pain management  Performed by  CRNA/CAA: Connor Barroso, CRNA  Assisted by: Brigid Ferrari RN  Preanesthetic Checklist  Completed: patient identified, IV checked, site marked, risks and benefits discussed, surgical consent, monitors and equipment checked, pre-op evaluation and timeout performed  Prep:  Pt Position: left lateral decubitus  Sterile barriers:cap, gloves, mask and washed/disinfected hands  Prep: ChloraPrep  Patient monitoring: blood pressure monitoring, continuous pulse oximetry and EKG  Procedure  Performed under: general  Guidance:ultrasound guided and landmark technique  Images:still images obtained, printed/placed on chart    Laterality:right  Block Type:PECS I and PECS II  Injection Technique:single-shot  Needle Type:short-bevel  Needle Gauge:20 G  Resistance on Injection: none    Medications Used: dexamethasone sodium phosphate injection - Injection   2 mg - 3/8/2024 11:43:00 AM  bupivacaine PF (MARCAINE) 0.25 % injection - Injection   30 mL - 3/8/2024 11:43:00 AM      Medications  Preservative Free Saline:5ml    Post Assessment  Injection Assessment: negative aspiration for heme, incremental injection and no paresthesia on injection  Patient Tolerance:comfortable throughout block  Complications:no  Additional Notes  Interpectoral-Pectoserratus Plane   A high-frequency linear transducer, with sterile cover, was placed medial to the coracoid process in the paramedian sagittal plane. The transducer was moved caudally to the 4th rib and rotated slightly to allow an in-plane needle trajectory from medial to lateral. Pectoralis Major Muscle (PMM), Pectoralis Minor Muscle (PmM), Thoracoacromial Artery, Ribs, and Pleura were identified under ultrasound. The insertion site was prepped in sterile fashion and then localized with 2-5 ml of  "1% Lidocaine. Using ultrasound-guidance, a 20-gauge B-Camejo 4\" Ultraplex 360 non-stimulating echogenic needle was advanced in plane until the tip of the needle was in the fascial plane between the PMM and PmM, lateral to the Thoracoacromial Artery. 1-3ml of preservative free normal saline was used to hydro-dissect the fascial planes. After the fascial plane was verified, 10ml local anesthetic (LA) was injected for Interpectoral fascial plane block. The needle was continued along the same path to the level of the 4th rib below PmM.  Initially preservative free normal saline was used to confirm needle position and then 20 ml of LA was injected for Pectoserratus fascial plane block. Aspiration every 5 ml to prevent intravascular injection. Injection was completed with negative aspiration of blood and negative intravascular injection. Injection pressures were normal with minimal resistance.               "

## 2024-03-08 NOTE — THERAPY EVALUATION
Patient Name: Matt Pruett  : 1955    MRN: [id number]                              Today's Date: 3/8/2024       Admit Date: 3/8/2024    Visit Dx: No diagnosis found.  Patient Active Problem List   Diagnosis    Proximal humeral fracture     Past Medical History:   Diagnosis Date    Cancer     LUNG, CURRENTLY RECEIVING CHEMO    Diabetes mellitus     Hypertension     Osteoarthritis      Past Surgical History:   Procedure Laterality Date    FINGER SURGERY      DEBRIDEMENT DUE TO BROWN RECLUSE SPIDER BITE    ROTATOR CUFF REPAIR Left       General Information       [facility] Name 24          OT Time and Intention    Document Type evaluation  -     Mode of Treatment occupational therapy;individual therapy  -       Row Name 24          General Information    Patient Profile Reviewed yes  -KF     Prior Level of Function independent:;all household mobility;community mobility;bed mobility;ADL's;driving  Independent, no AD at baseline  -     Existing Precautions/Restrictions fall;non-weight bearing;right;shoulder;oxygen therapy device and L/min  -     Barriers to Rehab none identified  -       Row Name 24          Living Environment    People in Home spouse  -       Row Name 24          Home Main Entrance    Number of Stairs, Main Entrance six  -KF     Stair Railings, Main Entrance railings on both sides of stairs  -       Row Name 24          Stairs Within Home, Primary    Number of Stairs, Within Home, Primary none  -     Stairs Comment, Within Home, Primary Pt states he has a walk-in shower with seat available and a standard height commode.  -       Row Name 24          Cognition    Orientation Status (Cognition) oriented x 4  -       Row Name 24          Safety Issues, Functional Mobility    Safety Issues Affecting Function (Mobility) awareness of need for assistance;insight into deficits/self-awareness;safety  precaution awareness;safety precautions follow-through/compliance;sequencing abilities  -KF     Impairments Affecting Function (Mobility) balance;shortness of breath;endurance/activity tolerance;strength;motor control;range of motion (ROM)  -KF               User Key  (r) = Recorded By, (t) = Taken By, (c) = Cosigned By      Initials Name Provider Type    KF Jennifer Dial OT Occupational Therapist                     Mobility/ADL's       Row Name 03/08/24 1751          Bed Mobility    Bed Mobility supine-sit  -KF     Supine-Sit [place] (Bed Mobility) standby assist;verbal cues  -KF     Bed Mobility, Safety Issues decreased use of arms for pushing/pulling  -KF     Assistive Device (Bed Mobility) bed rails;head of bed elevated  -KF     Comment, (Bed Mobility) Pt educated on R shoulder precautions and implication during bed mobility. Pt performed supine to sit transfer with SBA and cueing to maintain NWB RUE.  -       Row Name 03/08/24 1751          Transfers    Transfers bed-chair transfer;sit-stand transfer;stand-sit transfer  -       Row Name 03/08/24 1751          Bed-Chair Transfer    Bed-Chair [place] (Transfers) contact guard  -KF     Assistive Device (Bed-Chair Transfers) other (see comments)  no AD  -KF       Row Name 03/08/24 1751          Sit-Stand Transfer    Sit-Stand [place] (Transfers) contact guard  -KF       Row Name 03/08/24 1751          Stand-Sit Transfer    Stand-Sit [place] (Transfers) contact guard  -KF       Row Name 03/08/24 1751          Functional Mobility    Functional Mobility- Ind. Level contact guard assist  -KF     Functional Mobility- Device other (see comments)  no AD  -KF     Functional Mobility- Comment Pt ambulated >household distance in the hallway with CGA, no AD. The pt's SpO2 remained at or > 90% on RA, however pt did appear SOA.  -       Row Name 03/08/24 1751          Activities of Daily Living    BADL Assessment/Intervention bathing;upper body  dressing;feeding  -[facility] Name 03/08/24 1751          Bathing Assessment/Intervention    Comment, (Bathing) Pt and the pt's spouse were educated on R shoulder precautions, ADL retraining, and nerve catheter management including no showering while cath in place. Pt and spouse verbalized understanding.  -[facility] Name 03/08/24 1751          Upper Body Dressing Assessment/Training    [place] Level (Upper Body Dressing) doff;don;other (see comments);maximum assist (25% patient effort)  sling  -     Comment, (Upper Body Dressing) Pt and the pt's spouse were educated on RUE precautions and NWB, ADL retraining, sling management, and nerve catheter management. The pt and spouse were receptive and participatory during demonstration of all education.  -[facility] Name 03/08/24 1751          Self-Feeding Assessment/Training    [place] Level (Feeding) prepare tray/open items;minimum assist (75% patient effort);liquids to mouth;scoop food and bring to mouth;set up  -     Position (Self-Feeding) supported sitting  -               User Key  (r) = Recorded By, (t) = Taken By, (c) = Cosigned By      Initials Name Provider Type     Jennifer Dial OT Occupational Therapist                   Obj/Interventions       [facility] Name 03/08/24 1754          Sensory Assessment (Somatosensory)    Sensory Assessment Pt reports numbness in RUE.  -[facility] Name 03/08/24 1754          Vision Assessment/Intervention    Visual Impairment/Limitations WFL  -[facility] Name 03/08/24 1754          Range of Motion Comprehensive    Comment, General Range of Motion R shoulder NT; R elbow, wrist, and hand WFL; LUE WFL  -[facility] Name 03/08/24 1754          Strength Comprehensive (MMT)    Comment, General Manual Muscle Testing (MMT) Assessment RUE NT; LUE [facility]  -[facility] Name 03/08/24 1754          Elbow/Forearm (Therapeutic Exercise)    Elbow/Forearm (Therapeutic Exercise) PROM (passive range of motion)  -      Elbow/Forearm PROM (Therapeutic Exercise) right;flexion;extension;supination;pronation;sitting;10 repetitions  -       Row Name 03/08/24 1754          Wrist (Therapeutic Exercise)    Wrist (Therapeutic Exercise) AROM (active range of motion)  -KF     Wrist AROM (Therapeutic Exercise) right;flexion;extension;10 repetitions  -       Row Name 03/08/24 1754          Hand (Therapeutic Exercise)    Hand (Therapeutic Exercise) AROM (active range of motion)  -KF     Hand AROM/AAROM (Therapeutic Exercise) right;AROM (active range of motion);finger flexion;finger extension;10 repetitions  -       Row Name 03/08/24 1754          Motor Skills    Therapeutic Exercise elbow/forearm;wrist;hand;other (see comments)  Pt and pt's spouse were education on RUE HEP within MD parameters.  -       Row Name 03/08/24 1754          Balance    Balance Assessment sitting static balance;sitting dynamic balance;sit to stand dynamic balance;standing static balance;standing dynamic balance  -KF     Static Sitting Balance standby assist  -KF     Dynamic Sitting Balance standby assist  -KF     Position, Sitting Balance unsupported;sitting edge of bed  -KF     Sit to Stand Dynamic Balance contact guard  -KF     Static Standing Balance contact guard  -KF     Dynamic Standing Balance contact guard  -KF     Position/Device Used, Standing Balance unsupported  -KF     Balance Interventions sitting;standing;sit to stand;supported;static;dynamic;occupation based/functional task  -KF               User Key  (r) = Recorded By, (t) = Taken By, (c) = Cosigned By      Initials Name Provider Type    KF Jennifer Dial OT Occupational Therapist                   Goals/Plan       Row Name 03/08/24 1545          Transfer Goal 1 (OT)    Activity/Assistive Device (Transfer Goal 1, OT) commode;bed-to-chair/chair-to-bed  -KF     [place] Level/Cues Needed (Transfer Goal 1, OT) supervision required  -KF     Time Frame (Transfer Goal 1, OT) long term goal  (LTG);10 days  -KF     Progress/Outcome (Transfer Goal 1, OT) goal ongoing  -KF       Row Name 03/08/24 1758          Dressing Goal 1 (OT)    Activity/Device (Dressing Goal 1, OT) upper body dressing  -KF     [place]/Cues Needed (Dressing Goal 1, OT) supervision required  -KF     Time Frame (Dressing Goal 1, OT) long term goal (LTG);10 days  -KF     Progress/Outcome (Dressing Goal 1, OT) goal ongoing  -KF       Row Name 03/08/24 1758          ROM Goal 1 (OT)    ROM Goal 1 (OT) Pt will completed RUE HEP within MD parameters without assistance.  -KF     Time Frame (ROM Goal 1, OT) long term goal (LTG);10 days  -KF     Progress/Outcome (ROM Goal 1, OT) goal ongoing  -KF       Row Name 03/08/24 1758          Therapy Assessment/Plan (OT)    Planned Therapy Interventions (OT) activity tolerance training;adaptive equipment training;BADL retraining;functional balance retraining;IADL retraining;occupation/activity based interventions;patient/caregiver education/training;ROM/therapeutic exercise;strengthening exercise;transfer/mobility retraining  -KF               User Key  (r) = Recorded By, (t) = Taken By, (c) = Cosigned By      Initials Name Provider Type    KF Jennifer Dial, MARCELINO Occupational Therapist                   Clinical Impression       Row Name 03/08/24 1756          Pain Assessment    Pretreatment Pain Rating 0/10 - no pain  -KF     Posttreatment Pain Rating 0/10 - no pain  -KF     Pain Intervention(s) Repositioned;Ambulation/increased activity  -       Row Name 03/08/24 1756          Plan of Care Review    Plan of Care Reviewed With patient;spouse  -KF     Progress no change  -KF     Outcome Evaluation OT evaluation completed. The pt and pt's spouse were education on R shoulder precautions and NWB, ADL retraining, sling management, and nerve catheter management. The pt and pt's spouse were receptive and participatory during session. The pt completed bed mobility with SBA and ambulated >household  distance in the hallway with CGA, no AD. The pt's SpO2 ranged 90-92%, though this pt did appear SOA. Sling management completed with mod/maxA. The pt will benefit from continued IP OT services to increase the pt's safety and independence during ADLs and functional mobility. Recommend a d/c home with assist when medically ready.  -       Row Name 03/08/24 1756          Therapy Assessment/Plan (OT)    Rehab Potential (OT) good, to achieve stated therapy goals  -     Criteria for Skilled Therapeutic Interventions Met (OT) yes;skilled treatment is necessary  -KF     Therapy Frequency (OT) daily  -       Row Name 03/08/24 1756          Therapy Plan Review/Discharge Plan (OT)    Anticipated Discharge Disposition (OT) home with assist  -       Row Name 03/08/24 1756          Vital Signs    Pre Systolic BP Rehab 139  -KF     Pre Treatment Diastolic BP 69  -KF     Pretreatment Heart Rate (beats/min) 95  -KF     Pre SpO2 (%) 93  -KF     O2 Delivery Pre Treatment nasal cannula  -KF     Intra SpO2 (%) 90  -KF     O2 Delivery Intra Treatment room air  -KF     Post SpO2 (%) 93  -KF     O2 Delivery Post Treatment nasal cannula  -KF     Pre Patient Position Supine  -KF     Intra Patient Position Standing  -KF     Post Patient Position Sitting  -KF       Row Name 03/08/24 1756          Positioning and Restraints    Pre-Treatment Position in bed  -KF     Post Treatment Position chair  -KF     In Chair notified nsg;reclined;call light within reach;encouraged to call for assist;exit alarm on;with family/caregiver;waffle cushion;legs elevated;RUE elevated;with brace;compression device  -KF               User Key  (r) = Recorded By, (t) = Taken By, (c) = Cosigned By      Initials Name Provider Type    Jennifer Nazario, MARCELINO Occupational Therapist                   Outcome Measures       Row Name 03/08/24 1759          How much help from another is currently needed...    Putting on and taking off regular lower body clothing? 3   -KF     Bathing (including washing, rinsing, and drying) 3  -KF     Toileting (which includes using toilet bed pan or urinal) 3  -KF     Putting on and taking off regular upper body clothing 3  -KF     Taking care of personal grooming (such as brushing teeth) 3  -KF     Eating meals 3  -KF     AM-PAC 6 Clicks Score (OT) 18  -KF       Row Name 03/08/24 1759 03/08/24 1705       How much help from another person do you currently need...    Turning from your back to your side while in flat bed without using bedrails? 3  -KF 3  -SC    Moving from lying on back to sitting on the side of a flat bed without bedrails? 3  -KF 3  -SC    Moving to and from a bed to a chair (including a wheelchair)? 3  -KF 3  -SC    Standing up from a chair using your arms (e.g., wheelchair, bedside chair)? 3  -KF 3  -SC    Climbing 3-5 steps with a railing? 3  -KF 3  -SC    To walk in hospital room? 3  -KF 3  -SC    AM-PAC 6 Clicks Score (PT) 18  -KF 18  -SC    Highest Level of Mobility Goal 6 --> Walk 10 steps or more  -KF 6 --> Walk 10 steps or more  -SC      Row Name 03/08/24 175          Functional Assessment    Outcome Measure Options AM-PAC 6 Clicks Basic Mobility (PT);AM-PAC 6 Clicks Daily Activity (OT)  -KF               User Key  (r) = Recorded By, (t) = Taken By, (c) = Cosigned By      Initials Name Provider Type    Blanche Muse RN Registered Nurse    Jennifer Nazario OT Occupational Therapist                    Occupational Therapy Education       Title: PT OT SLP Therapies (Done)       Topic: Occupational Therapy (Done)       Point: ADL training (Done)       Description:   Instruct learner(s) on proper safety adaptation and remediation techniques during self care or transfers.   Instruct in proper use of assistive devices.                  Learning Progress Summary             Patient Acceptance, E,TB, VU,DU by  at 3/8/2024 1714                         Point: Home exercise program (Done)       Description:    Instruct learner(s) on appropriate technique for monitoring, assisting and/or progressing therapeutic exercises/activities.                  Learning Progress Summary             Patient Acceptance, E,TB, VU,DU by  at 3/8/2024 1714                         Point: Precautions (Done)       Description:   Instruct learner(s) on prescribed precautions during self-care and functional transfers.                  Learning Progress Summary             Patient Acceptance, E,TB, VU,DU by  at 3/8/2024 1714                         Point: Body mechanics (Done)       Description:   Instruct learner(s) on proper positioning and spine alignment during self-care, functional mobility activities and/or exercises.                  Learning Progress Summary             Patient Acceptance, E,TB, VU,DU by  at 3/8/2024 1714                                         User Key       Initials Effective Dates Name Provider Type Discipline     08/09/23 -  Jennifer Dial OT Occupational Therapist OT                  OT Recommendation and Plan  Planned Therapy Interventions (OT): activity tolerance training, adaptive equipment training, BADL retraining, functional balance retraining, IADL retraining, occupation/activity based interventions, patient/caregiver education/training, ROM/therapeutic exercise, strengthening exercise, transfer/mobility retraining  Therapy Frequency (OT): daily  Plan of Care Review  Plan of Care Reviewed With: patient, spouse  Progress: no change  Outcome Evaluation: OT evaluation completed. The pt and pt's spouse were education on R shoulder precautions and NWB, ADL retraining, sling management, and nerve catheter management. The pt and pt's spouse were receptive and participatory during session. The pt completed bed mobility with SBA and ambulated >household distance in the hallway with CGA, no AD. The pt's SpO2 ranged 90-92%, though this pt did appear SOA. Sling management completed with mod/maxA. The pt will  benefit from continued IP OT services to increase the pt's safety and independence during ADLs and functional mobility. Recommend a d/c home with assist when medically ready.     Time Calculation:   Evaluation Complexity (OT)  Review Occupational Profile/Medical/Therapy History Complexity: expanded/moderate complexity  Assessment, Occupational Performance/Identification of Deficit Complexity: 3-5 performance deficits  Clinical Decision Making Complexity (OT): detailed assessment/moderate complexity  Overall Complexity of Evaluation (OT): moderate complexity     Time Calculation- OT       Row Name 03/08/24 1800             Time Calculation- OT    OT Start Time 1714  -KF      OT Received On 03/08/24  -KF      OT Goal Re-Cert Due Date 03/18/24  -KF         Timed Charges    22864 - OT Therapeutic Exercise Minutes 9  -KF      67376 - OT Therapeutic Activity Minutes 5  -KF      75461 - OT Self Care/Mgmt Minutes 8  -KF         Untimed Charges    OT Eval/Re-eval Minutes 46  -KF         Total Minutes    Timed Charges Total Minutes 22  -KF      Untimed Charges Total Minutes 46  -KF       Total Minutes 68  -KF                User Key  (r) = Recorded By, (t) = Taken By, (c) = Cosigned By      Initials Name Provider Type    KF Jennifer Dial OT Occupational Therapist                  Therapy Charges for Today       Code Description Service Date Service Provider Modifiers Qty    69842870171  OT EVAL MOD COMPLEXITY 4 3/8/2024 Jennifer Dial OT GO 1    [id number]  OT THER PROC EA 15 MIN 3/8/2024 Jennifer Dial OT GO 1                 Jennifer Dial OT  3/8/2024

## 2024-03-08 NOTE — ANESTHESIA PREPROCEDURE EVALUATION
Anesthesia Evaluation     Patient summary reviewed and Nursing notes reviewed   no history of anesthetic complications:   NPO Solid Status: > 8 hours  NPO Liquid Status: > 2 hours           Airway   Mallampati: II  TM distance: >3 FB  Neck ROM: full  No difficulty expected  Dental    (+) upper dentures and lower dentures    Pulmonary    (+) a smoker Current,sleep apnea on CPAP  (-) asthma, rhonchi, wheezes, no home oxygen  Cardiovascular - normal exam  Exercise tolerance: good (4-7 METS)    ECG reviewed    (+) hypertension  (-) dysrhythmias, angina, CHF, cardiac stents      Neuro/Psych  (-) seizures, TIA  GI/Hepatic/Renal/Endo    (+) obesity, diabetes mellitus  (-) GERD, liver disease, no renal disease, no thyroid disorder    Musculoskeletal     Abdominal    Substance History      OB/GYN          Other   arthritis,   history of cancer    ROS/Med Hx Other: Hgb 15.2 k 4.7   Lung cancer on chemo; hx of XRT and near chemo completion, no resection  No n/v/gerd/glp1                Anesthesia Plan    ASA 3     general with block     (Risks and benefits of general anesthesia discussed with patient (including MI, CVA, death, recall, aspiration, oropharyngeal/dental damage), questions answered, agreeable to proceed.    Benefits and risks of nerve block/catheter discussed with patient ((including failed block, damage to nerve/nearby structures, intravascular injection)); questions answered, agreeable to proceed.    )  intravenous induction     Anesthetic plan, risks, benefits, and alternatives have been provided, discussed and informed consent has been obtained with: patient.    Use of blood products discussed with patient  Consented to blood products.    Plan discussed with CRNA.    CODE STATUS:

## 2024-03-08 NOTE — PLAN OF CARE
Goal Outcome Evaluation:  Plan of Care Reviewed With: patient, spouse        Progress: no change  Outcome Evaluation: OT evaluation completed. The pt and pt's spouse were education on R shoulder precautions and NWB, ADL retraining, sling management, and nerve catheter management. The pt and pt's spouse were receptive and participatory during session. The pt completed bed mobility with SBA and ambulated >household distance in the hallway with CGA, no AD. The pt's SpO2 ranged 90-92%, though this pt did appear SOA. Sling management completed with mod/maxA. The pt will benefit from continued IP OT services to increase the pt's safety and independence during ADLs and functional mobility. Recommend a d/c home with assist when medically ready.      Anticipated Discharge Disposition (OT): home with assist

## 2024-03-08 NOTE — ANESTHESIA PROCEDURE NOTES
Airway  Urgency: elective    Date/Time: 3/8/2024 12:46 PM  Airway not difficult    General Information and Staff    Patient location during procedure: OR  Anesthesiologist: Brigid Rajan DO  SRNA: Julio Cesar Simpson SRNA  Indications and Patient Condition  Indications for airway management: airway protection    Preoxygenated: yes  MILS not maintained throughout  Mask difficulty assessment: 1 - vent by mask    Final Airway Details  Final airway type: endotracheal airway      Successful airway: ETT  Cuffed: yes   Successful intubation technique: direct laryngoscopy  Endotracheal tube insertion site: oral  Blade: Jackson  Blade size: 3  ETT size (mm): 7.5  Cormack-Lehane Classification: grade IIa - partial view of glottis  Placement verified by: chest auscultation and capnometry   Measured from: lips  ETT/EBT  to lips (cm): 24  Number of attempts at approach: 1  Assessment: lips, teeth, and gum same as pre-op and atraumatic intubation    Additional Comments  Negative epigastric sounds, Breath sound equal bilaterally with symmetric chest rise and fall

## 2024-03-08 NOTE — PLAN OF CARE
Patient arrived form PACU at 17:05.  Ambulated from stretcher to bed with minimal assist.  3L NC required for saturation of 94%.  INFU in place.  Aquacel remained CDI.  Patient has not voided.  BG elevated at 219.  Anticipated DC on Saturday if medically ready

## 2024-03-09 VITALS
SYSTOLIC BLOOD PRESSURE: 151 MMHG | BODY MASS INDEX: 34.57 KG/M2 | RESPIRATION RATE: 18 BRPM | HEART RATE: 97 BPM | DIASTOLIC BLOOD PRESSURE: 64 MMHG | WEIGHT: 246.91 LBS | OXYGEN SATURATION: 95 % | TEMPERATURE: 98.2 F | HEIGHT: 71 IN

## 2024-03-09 LAB
ANION GAP SERPL CALCULATED.3IONS-SCNC: 10 MMOL/L (ref 5–15)
BASOPHILS # BLD AUTO: 0.02 10*3/MM3 (ref 0–0.2)
BASOPHILS NFR BLD AUTO: 0.2 % (ref 0–1.5)
BUN SERPL-MCNC: 17 MG/DL (ref 8–23)
BUN/CREAT SERPL: 16.7 (ref 7–25)
CALCIUM SPEC-SCNC: 9.3 MG/DL (ref 8.6–10.5)
CHLORIDE SERPL-SCNC: 100 MMOL/L (ref 98–107)
CO2 SERPL-SCNC: 26 MMOL/L (ref 22–29)
CREAT SERPL-MCNC: 1.02 MG/DL (ref 0.76–1.27)
DEPRECATED RDW RBC AUTO: 40.9 FL (ref 37–54)
EGFRCR SERPLBLD CKD-EPI 2021: 81.1 ML/MIN/1.73
EOSINOPHIL # BLD AUTO: 0 10*3/MM3 (ref 0–0.4)
EOSINOPHIL NFR BLD AUTO: 0 % (ref 0.3–6.2)
ERYTHROCYTE [DISTWIDTH] IN BLOOD BY AUTOMATED COUNT: 11.9 % (ref 12.3–15.4)
GLUCOSE BLDC GLUCOMTR-MCNC: 158 MG/DL (ref 70–130)
GLUCOSE BLDC GLUCOMTR-MCNC: 199 MG/DL (ref 70–130)
GLUCOSE SERPL-MCNC: 181 MG/DL (ref 65–99)
HCT VFR BLD AUTO: 40.1 % (ref 37.5–51)
HGB BLD-MCNC: 13.7 G/DL (ref 13–17.7)
IMM GRANULOCYTES # BLD AUTO: 0.1 10*3/MM3 (ref 0–0.05)
IMM GRANULOCYTES NFR BLD AUTO: 0.8 % (ref 0–0.5)
LYMPHOCYTES # BLD AUTO: 1.04 10*3/MM3 (ref 0.7–3.1)
LYMPHOCYTES NFR BLD AUTO: 8.2 % (ref 19.6–45.3)
MCH RBC QN AUTO: 32 PG (ref 26.6–33)
MCHC RBC AUTO-ENTMCNC: 34.2 G/DL (ref 31.5–35.7)
MCV RBC AUTO: 93.7 FL (ref 79–97)
MONOCYTES # BLD AUTO: 1.46 10*3/MM3 (ref 0.1–0.9)
MONOCYTES NFR BLD AUTO: 11.5 % (ref 5–12)
NEUTROPHILS NFR BLD AUTO: 10.13 10*3/MM3 (ref 1.7–7)
NEUTROPHILS NFR BLD AUTO: 79.3 % (ref 42.7–76)
NRBC BLD AUTO-RTO: 0 /100 WBC (ref 0–0.2)
PLATELET # BLD AUTO: 147 10*3/MM3 (ref 140–450)
PMV BLD AUTO: 9.5 FL (ref 6–12)
POTASSIUM SERPL-SCNC: 5 MMOL/L (ref 3.5–5.2)
RBC # BLD AUTO: 4.28 10*6/MM3 (ref 4.14–5.8)
SODIUM SERPL-SCNC: 136 MMOL/L (ref 136–145)
WBC NRBC COR # BLD AUTO: 12.75 10*3/MM3 (ref 3.4–10.8)

## 2024-03-09 PROCEDURE — 25010000002 CEFAZOLIN PER 500 MG: Performed by: ORTHOPAEDIC SURGERY

## 2024-03-09 PROCEDURE — 63710000001 LISINOPRIL 5 MG TABLET: Performed by: INTERNAL MEDICINE

## 2024-03-09 PROCEDURE — 63710000001 INSULIN LISPRO (HUMAN) PER 5 UNITS: Performed by: INTERNAL MEDICINE

## 2024-03-09 PROCEDURE — 97535 SELF CARE MNGMENT TRAINING: CPT | Performed by: OCCUPATIONAL THERAPIST

## 2024-03-09 PROCEDURE — A9270 NON-COVERED ITEM OR SERVICE: HCPCS | Performed by: INTERNAL MEDICINE

## 2024-03-09 PROCEDURE — 80048 BASIC METABOLIC PNL TOTAL CA: CPT | Performed by: ORTHOPAEDIC SURGERY

## 2024-03-09 PROCEDURE — 82948 REAGENT STRIP/BLOOD GLUCOSE: CPT

## 2024-03-09 PROCEDURE — 85025 COMPLETE CBC W/AUTO DIFF WBC: CPT | Performed by: ORTHOPAEDIC SURGERY

## 2024-03-09 PROCEDURE — 97161 PT EVAL LOW COMPLEX 20 MIN: CPT

## 2024-03-09 PROCEDURE — 97110 THERAPEUTIC EXERCISES: CPT | Performed by: OCCUPATIONAL THERAPIST

## 2024-03-09 RX ORDER — ROPIVACAINE HYDROCHLORIDE 2 MG/ML
1 INJECTION, SOLUTION EPIDURAL; INFILTRATION; PERINEURAL CONTINUOUS
Start: 2024-03-09

## 2024-03-09 RX ORDER — OXYCODONE HYDROCHLORIDE 5 MG/1
5 TABLET ORAL EVERY 4 HOURS PRN
Qty: 20 TABLET | Refills: 0 | Status: SHIPPED | OUTPATIENT
Start: 2024-03-09

## 2024-03-09 RX ORDER — HYDROCODONE BITARTRATE AND ACETAMINOPHEN 5; 325 MG/1; MG/1
1 TABLET ORAL EVERY EVENING
Start: 2024-03-14

## 2024-03-09 RX ORDER — DOCUSATE SODIUM 100 MG/1
100 CAPSULE, LIQUID FILLED ORAL 2 TIMES DAILY
Qty: 30 CAPSULE | Refills: 0 | Status: SHIPPED | OUTPATIENT
Start: 2024-03-09 | End: 2024-03-24

## 2024-03-09 RX ADMIN — SODIUM CHLORIDE 2000 MG: 900 INJECTION INTRAVENOUS at 04:48

## 2024-03-09 RX ADMIN — INSULIN LISPRO 2 UNITS: 100 INJECTION, SOLUTION INTRAVENOUS; SUBCUTANEOUS at 09:05

## 2024-03-09 RX ADMIN — INSULIN LISPRO 2 UNITS: 100 INJECTION, SOLUTION INTRAVENOUS; SUBCUTANEOUS at 11:50

## 2024-03-09 RX ADMIN — LISINOPRIL 2.5 MG: 5 TABLET ORAL at 09:05

## 2024-03-09 NOTE — PLAN OF CARE
Goal Outcome Evaluation:  Plan of Care Reviewed With: patient        Progress: improving     Pt is A & O x4. VSS on 3 L NC. NSR. Pt is up with standby. Pt walked 300 feet in the hallway this shift. Pt slept throughout the shift.

## 2024-03-09 NOTE — THERAPY TREATMENT NOTE
Patient Name: Matt Pruett  : 1955    MRN: [id number]                              Today's Date: 3/9/2024       Admit Date: 3/8/2024    Visit Dx:     ICD-10-CM ICD-9-CM   1. Status post reverse total replacement of right shoulder, for proximal humerus fracture nonunion  Z96.611 V43.61     Patient Active Problem List   Diagnosis    Proximal humeral fracture    DM2 (diabetes mellitus, type 2)    HTN (hypertension)    Obesity (BMI 30-39.9)    Status post reverse total replacement of right shoulder, for proximal humerus fracture nonunion     Past Medical History:   Diagnosis Date    Cancer     LUNG, CURRENTLY RECEIVING CHEMO    Diabetes mellitus     Hypertension     Osteoarthritis      Past Surgical History:   Procedure Laterality Date    FINGER SURGERY      DEBRIDEMENT DUE TO BROWN RECLUSE SPIDER BITE    ROTATOR CUFF REPAIR Left       General Information       Row Name 24 1401          OT Time and Intention    Document Type therapy note (daily note)  -AR     Mode of Treatment individual therapy;occupational therapy  -AR       Row Name 24 1401          General Information    Existing Precautions/Restrictions fall;non-weight bearing;right;shoulder;oxygen therapy device and L/min  IS nerve catheter, Donjoy Ultra II  -AR       Row Name 24 1401          Cognition    Orientation Status (Cognition) oriented x 4  -AR       Row Name 24 1401          Safety Issues, Functional Mobility    Safety Issues Affecting Function (Mobility) safety precautions follow-through/compliance  -AR     Impairments Affecting Function (Mobility) strength;motor control;range of motion (ROM)  -AR               User Key  (r) = Recorded By, (t) = Taken By, (c) = Cosigned By      Initials Name Provider Type    Haily Moran OT Occupational Therapist                     Mobility/ADL's       Row Name 24 1405          Bed Mobility    Comment, (Bed Mobility) Reviewed safe sleeping positions and  importance of maintaining NWB RUE  -AR       Row Name 03/09/24 1405          Transfers    Transfers sit-stand transfer;stand-sit transfer  -AR       Row Name 03/09/24 1405          Sit-Stand Transfer    Sit-Stand [place] (Transfers) supervision;verbal cues  -AR     Comment, (Sit-Stand Transfer) Cues to attend to looation of nerve catheter  -AR       Row Name 03/09/24 1405          Stand-Sit Transfer    Stand-Sit [place] (Transfers) supervision;verbal cues  -AR       Row Name 03/09/24 1405          Activities of Daily Living    BADL Assessment/Intervention upper body dressing;bathing;lower body dressing;feeding  -AR       Row Name 03/09/24 1405          Mobility    Extremity Weight-bearing Status right upper extremity  -AR     Right Upper Extremity (Weight-bearing Status) non weight-bearing (NWB)  -AR       Row Name 03/09/24 1405          Bathing Assessment/Intervention    Comment, (Bathing) Reviewed R shoulder precautions,axilla care to maintain, that IS cannot get wet and issued LH sponge to geronimo at home  -AR       Row Name 03/09/24 1405          Upper Body Dressing Assessment/Training    [place] Level (Upper Body Dressing) doff;front opening garment;minimum assist (75% patient effort);don;pull-over garment;moderate assist (50% patient effort)  -AR     Position (Upper Body Dressing) unsupported sitting  -AR     Comment, (Upper Body Dressing) Reviewed R shoulder precautions, ADL retraining to maintain, sling management and care of interscalene nerve catheter to avoid dislodgement. Post review, pt and spouse able to don/doff sling with CGA.  -AR       Row Name 03/09/24 1405          Self-Feeding Assessment/Training    [place] Level (Feeding) liquids to mouth;supervision  -AR     Position (Self-Feeding) supported sitting  -AR       Row Name 03/09/24 1405          Lower Body Dressing Assessment/Training    [place] Level (Lower Body Dressing) don;pants/bottoms;moderate assist (50% patient  effort);shoes/slippers;socks;maximum assist (25% patient effort)  -AR     Position (Lower Body Dressing) unsupported sitting;unsupported standing  -AR     Comment, (Lower Body Dressing) Issued shoe horn and reacher to assist at home  -AR               User Key  (r) = Recorded By, (t) = Taken By, (c) = Cosigned By      Initials Name Provider Type    AR Haily Johnston OT Occupational Therapist                   Obj/Interventions       Row Name 03/09/24 1408          Sensory Assessment (Somatosensory)    Sensory Assessment (Somatosensory) right UE  -AR     Sensory Subjective Reports numbness  -AR       Row Name 03/09/24 1408          Vision Assessment/Intervention    Visual Impairment/Limitations WNL  -AR       Row Name 03/09/24 1408          Elbow/Forearm (Therapeutic Exercise)    Elbow/Forearm (Therapeutic Exercise) PROM (passive range of motion)  -AR     Elbow/Forearm PROM (Therapeutic Exercise) right;flexion;extension;supination;pronation;sitting;10 repetitions  -AR       Row Name 03/09/24 1408          Wrist (Therapeutic Exercise)    Wrist (Therapeutic Exercise) AAROM (active assistive range of motion)  -AR     Wrist AAROM (Therapeutic Exercise) right;flexion;extension;10 repetitions  -AR       Row Name 03/09/24 1408          Hand (Therapeutic Exercise)    Hand AROM/AAROM (Therapeutic Exercise) right;AAROM (active assistive range of motion);finger extension;finger flexion;10 repetitions  -AR       Row Name 03/09/24 1408          Motor Skills    Therapeutic Exercise elbow/forearm;wrist;hand  -AR       Row Name 03/09/24 1408          Balance    Balance Assessment sitting static balance;sitting dynamic balance;standing static balance;standing dynamic balance  -AR     Static Sitting Balance independent  -AR     Dynamic Sitting Balance independent  -AR     Position, Sitting Balance unsupported;sitting in chair  -AR     Static Standing Balance supervision  -AR     Dynamic Standing Balance supervision  -AR      Position/Device Used, Standing Balance unsupported  -AR               User Key  (r) = Recorded By, (t) = Taken By, (c) = Cosigned By      Initials Name Provider Type    Haily Moran OT Occupational Therapist                   Goals/Plan       Row Name 03/09/24 1411          Transfer Goal 1 (OT)    Progress/Outcome (Transfer Goal 1, OT) goal ongoing  -AR       Row Name 03/09/24 1411          Dressing Goal 1 (OT)    Progress/Outcome (Dressing Goal 1, OT) goal not met  -AR       Row Name 03/09/24 1411          ROM Goal 1 (OT)    Progress/Outcome (ROM Goal 1, OT) goal met  -AR               User Key  (r) = Recorded By, (t) = Taken By, (c) = Cosigned By      Initials Name Provider Type    Haily Moran OT Occupational Therapist                   Clinical Impression       Row Name 03/09/24 1409          Pain Assessment    Pretreatment Pain Rating 0/10 - no pain  -AR     Posttreatment Pain Rating 0/10 - no pain  -AR       Row Name 03/09/24 1409          Plan of Care Review    Plan of Care Reviewed With patient;spouse;family  -AR     Progress improving  -AR     Outcome Evaluation OT reviewed R shoulder precautions, ADL retraining to maintain, sling management and HEP. Pt and spouse able to manage sling and HEP without issue post teaching. Recommend DC home with spouse assist.  -AR       Row Name 03/09/24 1409          Therapy Plan Review/Discharge Plan (OT)    Anticipated Discharge Disposition (OT) home with assist  -AR       Row Name 03/09/24 1409          Vital Signs    Pre SpO2 (%) 93  -AR     O2 Delivery Pre Treatment room air  -AR     Intra SpO2 (%) 93  -AR     O2 Delivery Intra Treatment room air  -AR     Post SpO2 (%) 92  -AR     O2 Delivery Post Treatment room air  -AR     Pre Patient Position Sitting  -AR     Intra Patient Position Standing  -AR     Post Patient Position Sitting  -AR       Row Name 03/09/24 1409          Positioning and Restraints    Pre-Treatment Position sitting in  chair/recliner  -AR     Post Treatment Position chair  -AR     In Chair notified nsg;sitting;call light within reach;encouraged to call for assist;with family/caregiver;with brace  -AR               User Key  (r) = Recorded By, (t) = Taken By, (c) = Cosigned By      Initials Name Provider Type    Haily Moran OT Occupational Therapist                   Outcome Measures       Row Name 03/09/24 1411          How much help from another is currently needed...    Putting on and taking off regular lower body clothing? 2  -AR     Bathing (including washing, rinsing, and drying) 3  -AR     Toileting (which includes using toilet bed pan or urinal) 3  -AR     Putting on and taking off regular upper body clothing 2  -AR     Taking care of personal grooming (such as brushing teeth) 3  -AR     Eating meals 3  -AR     AM-PAC 6 Clicks Score (OT) 16  -AR       Row Name 03/09/24 1121 03/09/24 0800       How much help from another person do you currently need...    Turning from your back to your side while in flat bed without using bedrails? 3  -KG 3  -AC    Moving from lying on back to sitting on the side of a flat bed without bedrails? 3  -KG 3  -AC    Moving to and from a bed to a chair (including a wheelchair)? 3  -KG 3  -AC    Standing up from a chair using your arms (e.g., wheelchair, bedside chair)? 3  -KG 3  -AC    Climbing 3-5 steps with a railing? 3  -KG 3  -AC    To walk in hospital room? 3  -KG 3  -AC    AM-PAC 6 Clicks Score (PT) 18  -KG 18  -AC    Highest Level of Mobility Goal 6 --> Walk 10 steps or more  -KG 6 --> Walk 10 steps or more  -AC      Row Name 03/09/24 1411 03/09/24 1121       Functional Assessment    Outcome Measure Options AM-PAC 6 Clicks Daily Activity (OT)  -AR AM-PAC 6 Clicks Basic Mobility (PT)  -KG              User Key  (r) = Recorded By, (t) = Taken By, (c) = Cosigned By      Initials Name Provider Type    Haily Moran OT Occupational Therapist    Glenna Triplett RN Registered  Nurse    Amanda Dillon Physical Therapist                    Occupational Therapy Education       Title: PT OT SLP Therapies (Done)       Topic: Occupational Therapy (Done)       Point: ADL training (Done)       Description:   Instruct learner(s) on proper safety adaptation and remediation techniques during self care or transfers.   Instruct in proper use of assistive devices.                  Learning Progress Summary             Patient Eager, E,D,TB,H, DU,VU by AR at 3/9/2024 1411    Acceptance, E,TB, VU,DU by KF at 3/8/2024 1714   Family Eager, E,D,TB,H, DU,VU by AR at 3/9/2024 1411                         Point: Home exercise program (Done)       Description:   Instruct learner(s) on appropriate technique for monitoring, assisting and/or progressing therapeutic exercises/activities.                  Learning Progress Summary             Patient Eager, E,D,TB,H, DU,VU by AR at 3/9/2024 1411    Acceptance, E,TB, VU,DU by KF at 3/8/2024 1714   Family Eager, E,D,TB,H, DU,VU by AR at 3/9/2024 1411                         Point: Precautions (Done)       Description:   Instruct learner(s) on prescribed precautions during self-care and functional transfers.                  Learning Progress Summary             Patient Eager, E,D,TB,H, DU,VU by AR at 3/9/2024 1411    Acceptance, E,TB, VU,DU by KF at 3/8/2024 1714   Family Eager, E,D,TB,H, DU,VU by AR at 3/9/2024 1411                         Point: Body mechanics (Done)       Description:   Instruct learner(s) on proper positioning and spine alignment during self-care, functional mobility activities and/or exercises.                  Learning Progress Summary             Patient Eager, E,D,TB,H, DU,VU by AR at 3/9/2024 1411    Acceptance, E,TB, VU,DU by KF at 3/8/2024 1714   Family Eager, E,D,TB,H, DU,VU by AR at 3/9/2024 1411                                         User Key       Initials Effective Dates Name Provider Type Discipline    AR 07/11/23 -  [place]  Haily Braswell OT Occupational Therapist OT    KF 08/09/23 -  Jennifer Dial OT Occupational Therapist OT                  OT Recommendation and Plan     Plan of Care Review  Plan of Care Reviewed With: patient, spouse, family  Progress: improving  Outcome Evaluation: OT reviewed R shoulder precautions, ADL retraining to maintain, sling management and HEP. Pt and spouse able to manage sling and HEP without issue post teaching. Recommend DC home with spouse assist.     Time Calculation:         Time Calculation- OT       Row Name 03/09/24 1412             Time Calculation- OT    OT Start Time 1307  -AR      OT Received On 03/09/24  -AR      OT Goal Re-Cert Due Date 03/18/24  -AR         Timed Charges    85701 - OT Therapeutic Exercise Minutes 12  -AR      62682 - OT Self Care/Mgmt Minutes 42  -AR         Total Minutes    Timed Charges Total Minutes 54  -AR       Total Minutes 54  -AR                User Key  (r) = Recorded By, (t) = Taken By, (c) = Cosigned By      Initials Name Provider Type    AR Haily Johnston OT Occupational Therapist                  Therapy Charges for Today       Code Description Service Date Service Provider Modifiers Qty    12379690386 HC OT SELF CARE/MGMT/TRAIN EA 15 MIN 3/9/2024 Haily Johnston OT GO 3    74195208409 HC OT THER PROC EA 15 MIN 3/9/2024 Haily Johnston OT GO 1                 Haily Johnston OT  3/9/2024

## 2024-03-09 NOTE — PLAN OF CARE
Goal Outcome Evaluation:  Plan of Care Reviewed With: patient        Progress: improving  Outcome Evaluation: DC'd home

## 2024-03-09 NOTE — PROGRESS NOTES
"Orthopedic Daily Progress Note      CC: R Reverse TSA    Pain well controlled  General: no fevers, chills  Abdomen: no nausea, vomiting, or diarrhea    No other complaints    Physical Exam:  I have reviewed the vital signs.  Temp:  [97.4 °F (36.3 °C)-98.3 °F (36.8 °C)] 97.9 °F (36.6 °C)  Heart Rate:  [] 110  Resp:  [15-18] 18  BP: (124-171)/(64-80) 155/76    Objective:  Vital signs: (most recent): Blood pressure 155/76, pulse 110, temperature 97.9 °F (36.6 °C), temperature source Oral, resp. rate 18, height 180.3 cm (71\"), weight 112 kg (246 lb 14.6 oz), SpO2 94%.              General Appearance:    Alert, cooperative, no distress  Extremities: No clubbing, cyanosis, or edema to lower extremities  Pulses:  2+ in distal surgical extremity  Skin: Dressing Clean/dry/intact      Results Review:    I have reviewed the labs, radiology results and diagnostic studies:    Results from last 7 days   Lab Units 03/09/24  0509   WBC 10*3/mm3 12.75*   HEMOGLOBIN g/dL 13.7   PLATELETS 10*3/mm3 147     Results from last 7 days   Lab Units 03/09/24  0509   SODIUM mmol/L 136   POTASSIUM mmol/L 5.0   CO2 mmol/L 26.0   CREATININE mg/dL 1.02   GLUCOSE mg/dL 181*       I have reviewed the medications.    Assessment/Problem List  POD# 1 Day Post-Op   S/p TSA    Plan  1) dressing in place until fu  2) ISB block + po pain meds  3) OT to see today-- patient may do elbow wrist and hand ROM only        Discharge Planning: I expect patient to be discharged to home when clears OT    Bennett Noel MD  03/09/24  08:27 EST            "

## 2024-03-09 NOTE — H&P
Patient Name: Matt Pruett  MRN: [id number]  : 1955  DOS: 3/8/2024    Attending: Bennett Noel MD    Primary Care Provider: Provider, No Known      Chief complaint:  Right Shoulder pain    Subjective   Patient is a pleasant 68 y.o. male presented for scheduled surgery by Dr. Noel.    Patient has had history of injury to right upper extremity over 2 years ago.  At the time he had a fall.  He ended up with a nonunion of proximal humerus fracture.  Was seen by Dr. Noel and discussed options and decided to proceed with surgery.    I saw him preoperatively, doing fairly well, we discussed his past medical history and home medications and upcoming surgery.    Subsequent records indicate he later underwent right reverse total shoulder arthroplasty under [facility] and a block, tolerated surgery well, was admitted for further management.       Allergies:  No Known Allergies    Meds:  Medications Prior to Admission   Medication Sig Dispense Refill Last Dose    benzoyl peroxide 5 % external wash Use as directed by Dr. Noel. 148 mL 0 3/8/2024    ondansetron (ZOFRAN) 4 MG tablet Take 1 tablet by mouth Every 8 (Eight) Hours As Needed for post-operative nausea. 30 tablet 0 3/8/2024    DICLOFENAC PO Take 50 mg by mouth 2 (Two) Times a Day.   Unknown    glimepiride (AMARYL) 2 MG tablet Take 1 tablet by mouth Every Morning Before Breakfast.   3/4/2024    HYDROcodone-acetaminophen (NORCO) 5-325 MG per tablet Take 1 tablet by mouth Every Evening.   3/4/2024    lisinopril (PRINIVIL,ZESTRIL) 2.5 MG tablet Take 1 tablet by mouth Daily.   3/4/2024    metFORMIN (GLUCOPHAGE) 500 MG tablet Take 1 tablet by mouth 2 (Two) Times a Day With Meals.   3/4/2024    rosuvastatin (CRESTOR) 5 MG tablet Take 1 tablet by mouth Daily.   3/4/2024          Past Medical History:   Diagnosis Date    Cancer     LUNG, CURRENTLY RECEIVING CHEMO    Diabetes mellitus     Hypertension     Osteoarthritis      Past Surgical History:  "  Procedure Laterality Date    FINGER SURGERY      DEBRIDEMENT DUE TO BROWN RECLUSE SPIDER BITE    ROTATOR CUFF REPAIR Left      History reviewed. No pertinent family history.  Social History     Tobacco Use    Smoking status: Some Days     Current packs/day: 0.25     Average packs/day: 0.3 packs/day for 30.0 years (7.5 ttl pk-yrs)     Types: Cigarettes   Vaping Use    Vaping status: Never Used   Substance Use Topics    Alcohol use: Never    Drug use: Never       Review of Systems  Pertinent items are noted in HPI    Vital Signs  /69 (BP Location: Right arm, Patient Position: Lying)   Pulse 98   Temp 98.3 °F (36.8 °C) (Oral)   Resp 16   Ht 180.3 cm (71\")   Wt 112 kg (246 lb 14.6 oz)   SpO2 93%   BMI 34.44 kg/m²     Physical Exam:    General Appearance:    Alert, cooperative, in no acute distress   Head:    Normocephalic, without obvious abnormality, atraumatic   Eyes:            Lids and lashes normal, conjunctivae and sclerae normal, no   icterus, no pallor, corneas clear,    Ears:    Ears appear intact with no abnormalities noted   Throat:   No oral lesions, no thrush, oral mucosa moist   Neck:   No adenopathy, supple, trachea midline, no thyromegaly         Lungs:     Clear to auscultation,respirations regular, even and                   unlabored    Heart:    Regular rhythm and normal rate, normal S1 and S2, no      murmur    Abdomen:     Normal bowel sounds, no masses, no organomegaly, soft        non-tender, non-distended, no guarding, no rebound                 tenderness   Genitalia:    Deferred   Extremities: Right UE with no edema peripheral nerve block catheter present when seen preop  Distal pulses, cap refill, movements of fingers, wrist, intact.     Pulses:   Pulses palpable and equal bilaterally   Skin:   No bleeding, bruising or rash   Neurologic:   Cranial nerves 2 - 12 grossly intact      I reviewed the patient's new clinical results.             Invalid input(s): " "\"NEUTOPHILPCT\"        Invalid input(s): \"LABALBU\", \"PROT\"  Lab Results   Component Value Date    HGBA1C 5.60 03/01/2024        Latest Reference Range & Units 03/01/24 15:26   Sodium 136 - 145 mmol/L 139   Potassium 3.5 - 5.2 mmol/L 4.7   Chloride 98 - 107 mmol/L 102   CO2 22.0 - 29.0 mmol/L 27.0   Anion Gap 5.0 - 15.0 mmol/L 10.0   BUN 8 - 23 mg/dL 18   Creatinine 0.76 - 1.27 mg/dL 0.99   BUN/Creatinine Ratio 7.0 - 25.0  18.2   eGFR >60.0 mL/min/1.73 84.0   Glucose 65 - 99 mg/dL 95   Calcium 8.6 - 10.5 mg/dL 9.2   Alkaline Phosphatase 39 - 117 U/L 88   Total Protein 6.0 - 8.5 g/dL 7.0   Albumin 3.5 - 5.2 g/dL 4.6   Globulin gm/dL 2.4   A/G Ratio g/dL 1.9   AST (SGOT) 1 - 40 U/L 13   ALT (SGPT) 1 - 41 U/L 10   Total Bilirubin 0.0 - 1.2 mg/dL 0.5   Hemoglobin A1C 4.80 - 5.60 % 5.60   Protime 12.2 - 14.5 Seconds 13.4   INR 0.89 - 1.12  1.01   PTT 22.0 - 39.0 seconds 28.1   WBC 3.40 - 10.80 10*3/mm3 9.42   RBC 4.14 - 5.80 10*6/mm3 4.54   Hemoglobin 13.0 - 17.7 g/dL 15.2   Hematocrit 37.5 - 51.0 % 44.0   Platelets 140 - 450 10*3/mm3 178   RDW 12.3 - 15.4 % 12.1 (L)   MCV 79.0 - 97.0 fL 96.9   MCH 26.6 - 33.0 pg 33.5 (H)   MCHC 31.5 - 35.7 g/dL 34.5   MPV 6.0 - 12.0 fL 9.3   RDW-SD 37.0 - 54.0 fl 43.3   (L): Data is abnormally low  (H): Data is abnormally high    Assessment and Plan:       Status post reverse total replacement of right shoulder, for proximal humerus fracture nonunion    Proximal humeral fracture    DM2 (diabetes mellitus, type 2)    HTN (hypertension)    Obesity (BMI 30-39.9)      Plan    1. PT/OT. NWB, right UE, ROM hand, wrist, elbow.  2. Pain control-prns, interscalene nerve block cath with ropivacaine infusion.   3. IS-encourage  4. DVT proph- Mech/ mobilize.  5. Bowel regimen  6. Resume home medications as appropriate  7. Monitor post-op labs  8. DC planning for home    - Hypertension:  Resume home medications as appropriate, formulary substitution when indicated.  Holding parameters.  Prn " medications for elevated blood pressure.    -DM type 2  Hgb A1C 5.6  Hold OHA as appropriate  FSBG AC/HS, ( q 6 when NPO), along with correction humalog.  Long acting insulin.    -Tobacco abuse.  Counseled on cessation.  Nicotine patch if desired.      Dragon disclaimer:  Part of this encounter note is an electronic transcription/translation of spoken language to printed text. The electronic translation of spoken language may permit erroneous, or at times, nonsensical words or phrases to be inadvertently transcribed; Although I have reviewed the note for such errors, some may still exist.    Samuel Ni MD  03/08/24  19:00 EST

## 2024-03-09 NOTE — DISCHARGE INSTRUCTIONS
InfuBLOCK - Patient Information    What is a pain pump?  The InfuBLOCK pump delivers post-operative, non-narcotic, numbing medication to the nerve near the surgical site for pain relief.     Where can I find information about my pain pump?           For more information about your pain pump, scan the QR code.  For additional patient resources, visit [facility]resources-pain-management.                                                                                               While your physician is your primary source for information about your treatment there may be times during your treatment that you need assistance with your infusion pump.     If you need assistance take the following steps:    The [facility] Hotline is Here for You 24/7.  Please call [phone] for the following concerns or complications:    Answers to questions about your infusion pump                 Tubing disconnect  Assistance with pump alarms                                                      Dislodged catheter  Excessive leakage noted from pump                                         Inadequate pain control    2.   [facility] Anesthesia Acute Pain Service: [phone] is available 24/7 for any further needs or concerns about medication or pain control.     -------------------------------------------------------------------------    Nerve Catheter Removal Instructions  When your device is empty:    Remove your catheter by pulling the dressing off slowly (like you would remove a regular bandage). The catheter should pull right out of the skin.  Check that the BLUE tip is intact.                                                                                     If the catheter is stuck, reposition your   extremity and pull slowly until removed.  *If catheter is HURTING and WON'T come out, stop and call [phone] for further assistance.    Remove medication bag from the black carrying case.  Cut the  tubing on right and left side of pump, and discard the medication bag and tubing into garbage.  Place the pump and black carrying case into the plastic bag and then place this into the return box.  Seal box with blue stickers and return to [place] postal service. THIS IS PRE-PAID POSTAGE.        -------------------------------------------------------------------------    [facility] COLD THERAPY - PATIENT INSTRUCTION SHEET    Cold Compression Therapy for your comfort and rehabilitation  Your caregivers want you to be productive in your rehab and comfortable during your stay. In keeping with those goals, you will be receiving an SMI Cold Therapy Wrap to help ease post-operative pain and swelling that might keep you from getting back on track! Your SMI Cold Therapy Wrap is effective and simple-to-use, and you will be encouraged to apply it throughout your hospital stay and at home through the duration of your recovery.    When you are ready to go home  Be sure to take your SMI Cold Therapy Wrap and both sets of Gel Bags with you for continued comfort and use throughout your rehabilitation. If you don't already have them, ask your nurse or aide to retrieve your SMI Gel Bags from the patient freezer.    Home use precautions  Always follow your medical professional's application instructions upon discharge. Your SMI Cold Therapy Wrap and Gel Bags are designed to last for months following your surgery. Never heat the Gel Bags unless specified by your healthcare provider. Supervision is advised when using this product on children or geriatric patients. To avoid danger of suffocation, please keep the outer plastic packaging away from children & pets.    Cold Therapy Instructions  Place Gel Bags in a freezer set ¾ of the way to max temperature for at least (4) hours. For best results, lay the Gel Bags flat and [id number] in the freezer. Once frozen, slide Gel Bags into the gel pouch and secure your wrap to the affected area with the  straps.  Gel wraps that have been stored in a freezer for an extended period of time may require a (10) minute period of softening up in a room temperature environment before application.  The gel pouch acts as a protective barrier. NEVER place frozen bags directly onto skin, as this may cause frostbite injury.  The [facility] Cold Therapy Wrap is designed to be able to be worm while ambulating. The compression straps can be secured well enough so that the Wrap won't fall off while moving.  Wrap Application Videos can be viewed at [employer].  An additional protective barrier such as clothing, a washcloth, hand-towel or pillowcase may be used during prolonged treatment applications.  The Gel-Pouch and Wrap are both Latex-Free and the Gel Bag ingredients are non toxic.    [facility] Wrap care instructions  The [facility] Cold Therapy Wrap may be hand washed and hung to dry when needed.    [facility] re-order information  Additional [facility] body specific wraps and/or Gel Bags can be re-ordered from [employer] or call [employer] ([phone])

## 2024-03-09 NOTE — THERAPY DISCHARGE NOTE
Patient Name: Matt Pruett  : 1955    MRN: [id number]                              Today's Date: 3/9/2024       Admit Date: 3/8/2024    Visit Dx:     ICD-10-CM ICD-9-CM   1. Status post reverse total replacement of right shoulder, for proximal humerus fracture nonunion  Z96.611 V43.61     Patient Active Problem List   Diagnosis    Proximal humeral fracture    DM2 (diabetes mellitus, type 2)    HTN (hypertension)    Obesity (BMI 30-39.9)    Status post reverse total replacement of right shoulder, for proximal humerus fracture nonunion     Past Medical History:   Diagnosis Date    Cancer     LUNG, CURRENTLY RECEIVING CHEMO    Diabetes mellitus     Hypertension     Osteoarthritis      Past Surgical History:   Procedure Laterality Date    FINGER SURGERY      DEBRIDEMENT DUE TO BROWN RECLUSE SPIDER BITE    ROTATOR CUFF REPAIR Left       General Information       Row Name 24 1111          Physical Therapy Time and Intention    Document Type evaluation;discharge evaluation/summary  -KG     Mode of Treatment physical therapy  -KG       Row Name 24 1111          General Information    Patient Profile Reviewed yes  -KG     Prior Level of Function independent:;all household mobility;ADL's  Independent, no AD at baseline  -KG     Existing Precautions/Restrictions fall;non-weight bearing;right;shoulder;oxygen therapy device and L/min  -KG     Barriers to Rehab none identified  -KG       Row Name 24 1111          Living Environment    People in Home spouse  -KG       Row Name 24 1111          Home Main Entrance    Number of Stairs, Main Entrance six  -KG     Stair Railings, Main Entrance railings on both sides of stairs  -KG       Row Name 24 1111          Stairs Within Home, Primary    Number of Stairs, Within Home, Primary none  -KG       Row Name 24 1111          Cognition    Orientation Status (Cognition) oriented x 4  -KG       Row Name 24 1111          Safety  Issues, Functional Mobility    Safety Issues Affecting Function (Mobility) awareness of need for assistance;insight into deficits/self-awareness;safety precaution awareness;safety precautions follow-through/compliance  -KG     Impairments Affecting Function (Mobility) balance;shortness of breath;endurance/activity tolerance;strength;motor control;range of motion (ROM)  -KG               User Key  (r) = Recorded By, (t) = Taken By, (c) = Cosigned By      Initials Name Provider Type    KG Amanda Avitia Physical Therapist                   Mobility       Row Name 03/09/24 1112          Bed Mobility    Bed Mobility supine-sit  -KG     Supine-Sit Jordan (Bed Mobility) standby assist;verbal cues  -KG     Assistive Device (Bed Mobility) bed rails;head of bed elevated  -KG       Row Name 03/09/24 1112          Transfers    Comment, (Transfers) CGA, no AD  -KG       Row Name 03/09/24 1112          Bed-Chair Transfer    Bed-Chair Jordan (Transfers) contact guard  -KG       Row Name 03/09/24 1112          Sit-Stand Transfer    Sit-Stand Jordan (Transfers) contact guard  -KG       Row Name 03/09/24 1112          Gait/Stairs (Locomotion)    Jordan Level (Gait) contact guard  -KG     Assistive Device (Gait) other (see comments)  no AD  -KG     Distance in Feet (Gait) 200  -KG     Jordan Level (Stairs) contact guard  -KG     Assistive Device (Stairs) other (see comments)  no AD  -KG     Handrail Location (Stairs) left side (ascending)  -KG     Number of Steps (Stairs) 6  -KG     Ascending Technique (Stairs) step-to-step  -KG     Descending Technique (Stairs) step-to-step  -KG     Comment, (Gait/Stairs) Pt able to ambulate 200', CGA, no AD, O2 sats remained 91% or above throughout.  cues to decrease speed and increase awareness of obstacles/ doorways.  demonstrated safety on steps, CGA, 6 steps  -KG               User Key  (r) = Recorded By, (t) = Taken By, (c) = Cosigned By      Initials Name  Provider Type    Amanda Dillon Physical Therapist                   Obj/Interventions       Row Name 03/09/24 1119          Range of Motion Comprehensive    General Range of Motion no range of motion deficits identified  -KG       Row Name 03/09/24 1119          Strength Comprehensive (MMT)    General Manual Muscle Testing (MMT) Assessment no strength deficits identified  -KG       [facility] Name 03/09/24 1119          Motor Skills    Therapeutic Exercise other (see comments)  STS x10, heel raises x10  -KG       [facility] Name 03/09/24 1119          Balance    Balance Assessment standing dynamic balance  -KG     Dynamic Standing Balance contact guard  -KG     Position/Device Used, Standing Balance unsupported  -KG     Balance Interventions standing;sit to stand  -KG               User Key  (r) = Recorded By, (t) = Taken By, (c) = Cosigned By      Initials Name Provider Type    TAMMIE Amanda Avitia Physical Therapist                   Goals/Plan    No documentation.                  Clinical Impression       Row Name 03/09/24 1120          Pain    Pretreatment Pain Rating 0/10 - no pain  -KG     Posttreatment Pain Rating 0/10 - no pain  -KG       Row Name 03/09/24 1120          Plan of Care Review    Plan of Care Reviewed With patient;family  -KG     Progress improving  -KG     Outcome Evaluation Pt able to ambulate 200', CGA, no AD, O2 sats remained 91% or above throughout. cues to decrease speed and increase awareness of obstacles/ doorways. demonstrated safety on steps, CGA, 6 steps.  recommend home with assist at d/c  -KG       [facility] Name 03/09/24 1120          Therapy Assessment/Plan (PT)    Criteria for Skilled Interventions Met (PT) no problems identified which require skilled intervention  -KG       [facility] Name 03/09/24 1120          Vital Signs    Pre SpO2 (%) 94  -KG     O2 Delivery Pre Treatment room air  -KG     Intra SpO2 (%) 91  -KG     O2 Delivery Intra Treatment room air  -KG     Post SpO2 (%) 93  -KG     O2  Delivery Post Treatment room air  -KG       Row Name 03/09/24 1120          Positioning and Restraints    Pre-Treatment Position in bed  -KG     Post Treatment Position chair  -KG     In Chair notified nsg;call light within reach;encouraged to call for assist;exit alarm on;waffle cushion;legs elevated  -KG               User Key  (r) = Recorded By, (t) = Taken By, (c) = Cosigned By      Initials Name Provider Type    Amanda Dillon Physical Therapist                   Outcome Measures       Row Name 03/09/24 1121 03/09/24 0800       How much help from another person do you currently need...    Turning from your back to your side while in flat bed without using bedrails? 3  -KG 3  -AC    Moving from lying on back to sitting on the side of a flat bed without bedrails? 3  -KG 3  -AC    Moving to and from a bed to a chair (including a wheelchair)? 3  -KG 3  -AC    Standing up from a chair using your arms (e.g., wheelchair, bedside chair)? 3  -KG 3  -AC    Climbing 3-5 steps with a railing? 3  -KG 3  -AC    To walk in hospital room? 3  -KG 3  -AC    AM-PAC 6 Clicks Score (PT) 18  -KG 18  -AC    Highest Level of Mobility Goal 6 --> Walk 10 steps or more  -KG 6 --> Walk 10 steps or more  -AC      Row Name 03/09/24 1121          Functional Assessment    Outcome Measure Options AM-PAC 6 Clicks Basic Mobility (PT)  -KG               User Key  (r) = Recorded By, (t) = Taken By, (c) = Cosigned By      Initials Name Provider Type    Glenna Triplett RN Registered Nurse    Amanda Dillon Physical Therapist                  Physical Therapy Education       Title: PT OT SLP Therapies (Done)       Topic: Physical Therapy (Done)       Point: Mobility training (Done)       Learning Progress Summary             Patient Acceptance, E, NR,VU by TAMMIE at 3/9/2024 1121                         Point: Home exercise program (Done)       Learning Progress Summary             Patient Acceptance, E, NR,VU by TAMMIE at 3/9/2024 1121                          Point: Body mechanics (Done)       Learning Progress Summary             Patient Acceptance, E, NR,VU by KG at 3/9/2024 1121                         Point: Precautions (Done)       Learning Progress Summary             Patient Acceptance, E, NR,VU by KG at 3/9/2024 1121                                         User Key       Initials Effective Dates Name Provider Type Discipline    TAMMIE 01/04/23 -  Amanda Avitia Physical Therapist PT                  PT Recommendation and Plan     Plan of Care Reviewed With: patient, family  Progress: improving  Outcome Evaluation: Pt able to ambulate 200', CGA, no AD, O2 sats remained 91% or above throughout. cues to decrease speed and increase awareness of obstacles/ doorways. demonstrated safety on steps, CGA, 6 steps.  recommend home with assist at d/c     Time Calculation:         PT Charges       Row Name 03/09/24 1128             Time Calculation    Start Time 1037  -KG      PT Received On 03/09/24  -KG                User Key  (r) = Recorded By, (t) = Taken By, (c) = Cosigned By      Initials Name Provider Type    TAMMIE Amanda Avitia Physical Therapist                  Therapy Charges for Today       Code Description Service Date Service Provider Modifiers Qty    92355625738 HC PT EVAL LOW COMPLEXITY 4 3/9/2024 Amanda Avitia GP 1            PT G-Codes  Outcome Measure Options: AM-PAC 6 Clicks Basic Mobility (PT)  AM-PAC 6 Clicks Score (PT): 18  AM-PAC 6 Clicks Score (OT): 18         Amanda Avitia  3/9/2024

## 2024-03-09 NOTE — PLAN OF CARE
Goal Outcome Evaluation:  Plan of Care Reviewed With: patient, family        Progress: improving  Outcome Evaluation: Pt able to ambulate 200', CGA, no AD, O2 sats remained 91% or above throughout. cues to decrease speed and increase awareness of obstacles/ doorways. demonstrated safety on steps, CGA, 6 steps.  recommend home with assist at d/c

## 2024-03-09 NOTE — PLAN OF CARE
Goal Outcome Evaluation:  Plan of Care Reviewed With: patient, spouse, family        Progress: improving  Outcome Evaluation: OT reviewed R shoulder precautions, ADL retraining to maintain, sling management and HEP. Pt and spouse able to manage sling and HEP without issue post teaching. Recommend DC home with spouse assist.      Anticipated Discharge Disposition (OT): home with assist

## 2024-03-15 NOTE — DISCHARGE SUMMARY
Patient Name: Matt Pruett  MRN: [id number]  : 1955  DOS: 3/09/2024    Attending: No att. providers found    Primary Care Provider: Provider, No Known    Date of Admission:.3/8/2024 10:27 AM    Date of Discharge:  3/09/2024    Discharge Diagnosis:   Status post reverse total replacement of right shoulder, for proximal humerus fracture nonunion    Proximal humeral fracture    DM2 (diabetes mellitus, type 2)    HTN (hypertension)    Obesity (BMI 30-39.9)      Hospital Course    At admit:  Patient is a pleasant 68 y.o. male presented for scheduled surgery by Dr. Noel.     Patient has had history of injury to right upper extremity over 2 years ago.  At the time he had a fall.  He ended up with a nonunion of proximal humerus fracture.  Was seen by Dr. Noel and discussed options and decided to proceed with surgery.     I saw him preoperatively, doing fairly well, we discussed his past medical history and home medications and upcoming surgery.     Subsequent records indicate he later underwent right reverse total shoulder arthroplasty under GA and a block, tolerated surgery well, was admitted for further management.       After admit:  Patient was provided pain medications as needed for pain control, along with interscalene nerve block infusion of Ropivacaine    Adjustments were made to pain medications to optimize postop pain management.     Risks and benefits of opiate medications discussed with patient. DA report on chart was reviewed.    The patient was seen by OT and was taught exercises for right arm.  The patient used an IS for atelectasis prophylaxis and mechanicals for DVT prophylaxis.    Home medications were resumed as appropriate, and labs were monitored and remained fairly stable.     With the progress he has made, patient is ready for DC home today.      The patient will have an Infupump ( instructed on it during this admit)  Discussed with patient regarding plan and he shows  understanding and agreement.        Procedures Performed  Procedure(s):  REVERSE TOTAL SHOULDER ARTHROPLASTY - RIGHT       Pertinent Test Results:    I reviewed the patient's new clinical results.   Results from last 7 days   Lab Units 03/09/24  0509   WBC 10*3/mm3 12.75*   HEMOGLOBIN g/dL 13.7   HEMATOCRIT % 40.1   PLATELETS 10*3/mm3 147     Results from last 7 days   Lab Units 03/09/24  0509   SODIUM mmol/L 136   POTASSIUM mmol/L 5.0   CHLORIDE mmol/L 100   CO2 mmol/L 26.0   BUN mg/dL 17   CREATININE mg/dL 1.02   CALCIUM mg/dL 9.3   GLUCOSE mg/dL 181*     I reviewed the patient's new imaging including images and reports.      Occupational Therapy  Haily Johnston, MARCELINO   Occupational Therapist  Occupational Therapy     Plan of Care      Signed     Date of Service: 03/09/24 1412  Creation Time: 03/09/24 1412     Signed         Goal Outcome Evaluation:  Plan of Care Reviewed With: patient, spouse, family  Progress: improving  Outcome Evaluation: OT reviewed R shoulder precautions, ADL retraining to maintain, sling management and HEP. Pt and spouse able to manage sling and HEP without issue post teaching. Recommend DC home with spouse assist.        Anticipated Discharge Disposition (OT): home with assist                   Physical therapy  Amanda Avitia   Physical Therapist  Specialty: Physical Therapy     Plan of Care      Signed     Date of Service: 03/09/24 1037  Creation Time: 03/09/24 1128     Signed         Goal Outcome Evaluation:  Plan of Care Reviewed With: patient, family  Progress: improving  Outcome Evaluation: Pt able to ambulate 200', CGA, no AD, O2 sats remained 91% or above throughout. cues to decrease speed and increase awareness of obstacles/ doorways. demonstrated safety on steps, CGA, 6 steps.  recommend home with assist at d/c                      Discharge Assessment:       Visit Vitals  /64 (BP Location: Left arm, Patient Position: Sitting)   Pulse 97   Temp 98.2 °F (36.8 °C)  "(Oral)   Resp 18   Ht 180.3 cm (71\")   Wt 112 kg (246 lb 14.6 oz)   SpO2 95%   BMI 34.44 kg/m²     No data recorded.      General Appearance:    Alert, cooperative, in no acute distress   Lungs:     Clear to auscultation,respirations regular, even and   unlabored    Heart:    Regular rhythm and normal rate, normal S1 and S2    Abdomen:     Normal bowel sounds, no masses, no organomegaly, soft        non-tender, non-distended, no guarding, no rebound                 tenderness   Extremities: Right UE in a sling, CDI  dressing over right shoulder incision . Interscalene nerve block cath present.  Distal pulses, cap refill,  intact. Movement and sensation intact distally.     Pulses:   Pulses palpable and equal bilaterally   Skin:   No bleeding, bruising or rash        Discharge Disposition: Home          Discharge Medications        New Medications        Instructions Start Date   oxyCODONE 5 MG immediate release tablet  Commonly known as: Roxicodone   5 mg, Oral, Every 4 Hours PRN      ropivacaine 0.2 % infusion (INFUSYSTEM)  Commonly known as: NAROPIN  Notes to patient: Continuous infusion   1 mL/hr (2 mg/hr), Peripheral Nerve, Continuous      Stool Softener 100 MG capsule  Generic drug: docusate sodium   100 mg, Oral, 2 Times Daily             Continue These Medications        Instructions Start Date   DICLOFENAC PO   50 mg, Oral, 2 Times Daily      glimepiride 2 MG tablet  Commonly known as: AMARYL   2 mg, Oral, Every Morning Before Breakfast      HYDROcodone-acetaminophen 5-325 MG per tablet  Commonly known as: NORCO   1 tablet, Oral, Every Evening      lisinopril 2.5 MG tablet  Commonly known as: PRINIVIL,ZESTRIL   2.5 mg, Oral, Daily      metFORMIN 500 MG tablet  Commonly known as: GLUCOPHAGE   500 mg, Oral, 2 Times Daily With Meals      ondansetron 4 MG tablet  Commonly known as: ZOFRAN   Take 1 tablet by mouth Every 8 (Eight) Hours As Needed for post-operative nausea.      rosuvastatin 5 MG tablet  Commonly " known as: CRESTOR   5 mg, Oral, Daily             Stop These Medications      benzoyl peroxide 5 % external wash  Generic drug: benzoyl peroxide                 Diet Instructions    DIABETIC DIET           Activity at Discharge:   Activity Instructions    SLING TO AFFECTED SHOULDER AT ALL TIMES EXCEPT HYGIENE AND MAY REMOVE STRAPS WHEN SEATED            Follow-up Appointments:  Bennett Noel MD per his orders       Samuel Ni MD  03/15/24  07:24 EDT

## (undated) DEVICE — GLV SURG PREMIERPRO MIC LTX PF SZ6.5 BRN

## (undated) DEVICE — GLV SURG SENSICARE PI MIC PF SZ7.5 LF STRL

## (undated) DEVICE — PULLOVER TOGA, 2X LARGE: Brand: FLYTE, SURGICOOL

## (undated) DEVICE — ANTIBACTERIAL UNDYED BRAIDED (POLYGLACTIN 910), SYNTHETIC ABSORBABLE SUTURE: Brand: COATED VICRYL

## (undated) DEVICE — UNDERGLV SURG BIOGEL INDICAT PF 61/2 GRN

## (undated) DEVICE — DRP SURG U/DRP INVISISHIELD PA 48X52IN

## (undated) DEVICE — SUT VIC 2/0 CT2 27IN J269H

## (undated) DEVICE — DRAPE,REIN 53X77,STERILE: Brand: MEDLINE

## (undated) DEVICE — 3M™ IOBAN™ 2 ANTIMICROBIAL INCISE DRAPE 6651EZ: Brand: IOBAN™ 2

## (undated) DEVICE — TBG PENCL TELESCP MEGADYNE SMOKE EVAC 10FT

## (undated) DEVICE — ABSORBABLE HEMOSTAT (OXIDIZED REGENERATED CELLULOSE): Brand: SURGICEL

## (undated) DEVICE — DRAPE,TOWEL,LARGE,INVISISHIELD: Brand: MEDLINE

## (undated) DEVICE — PATIENT RETURN ELECTRODE, SINGLE-USE, CONTACT QUALITY MONITORING, ADULT, WITH 9FT CORD, FOR PATIENTS WEIGING OVER 33LBS. (15KG): Brand: MEGADYNE

## (undated) DEVICE — UNDERGLV SURG BIOGEL INDICAT PI SZ8 BLU

## (undated) DEVICE — GLV SURG SENSICARE PI ORTHO SZ7.5 LF STRL

## (undated) DEVICE — INTENDED FOR TISSUE SEPARATION, AND OTHER PROCEDURES THAT REQUIRE A SHARP SURGICAL BLADE TO PUNCTURE OR CUT.: Brand: BARD-PARKER ® CARBON RIB-BACK BLADES

## (undated) DEVICE — SPNG GZ WOVN 4X4IN 12PLY 10/BX STRL

## (undated) DEVICE — STRYKER PERFORMANCE SERIES SAGITTAL BLADE: Brand: STRYKER PERFORMANCE SERIES

## (undated) DEVICE — INTENDED TO SUPPORT AND MAINTAIN THE POSITION OF AN ANESTHETIZED PATIENT DURING SURGERY: Brand: ERIN BEACH CHAIR FACE MASK

## (undated) DEVICE — 450 ML BOTTLE OF 0.05% CHLORHEXIDINE GLUCONATE IN 99.95% STERILE WATER FOR IRRIGATION, USP AND APPLICATOR.: Brand: IRRISEPT ANTIMICROBIAL WOUND LAVAGE

## (undated) DEVICE — 4.0MM OVAL SOLID CARBIDE BUR LONG

## (undated) DEVICE — TRAP FLD MINIVAC MEGADYNE 100ML

## (undated) DEVICE — SOL ISO/ALC RUB 70PCT 4OZ

## (undated) DEVICE — BLANKT WARM LOWR/BDY 100X120CM

## (undated) DEVICE — SKN PREP SPNG STKS PVP PNT STR: Brand: MEDLINE INDUSTRIES, INC.

## (undated) DEVICE — PK MAJ SHLDR SPLT 10